# Patient Record
Sex: FEMALE | Race: BLACK OR AFRICAN AMERICAN | Employment: UNEMPLOYED | URBAN - METROPOLITAN AREA
[De-identification: names, ages, dates, MRNs, and addresses within clinical notes are randomized per-mention and may not be internally consistent; named-entity substitution may affect disease eponyms.]

---

## 2017-03-05 ENCOUNTER — HOSPITAL ENCOUNTER (OUTPATIENT)
Dept: CT IMAGING | Age: 57
Discharge: HOME | End: 2017-03-05
Attending: ORTHOPAEDIC SURGERY
Payer: COMMERCIAL

## 2017-03-05 DIAGNOSIS — M54.16 LUMBAR RADICULOPATHY: ICD-10-CM

## 2017-03-05 PROCEDURE — 72131 CT LUMBAR SPINE W/O DYE: CPT

## 2017-09-20 ENCOUNTER — HOSPITAL ENCOUNTER (OUTPATIENT)
Dept: LAB | Age: 57
Discharge: HOME | End: 2017-09-20

## 2017-09-20 ENCOUNTER — HOSPITAL ENCOUNTER (OUTPATIENT)
Dept: GENERAL RADIOLOGY | Age: 57
Discharge: HOME | End: 2017-09-20
Payer: COMMERCIAL

## 2017-09-20 DIAGNOSIS — M12.279: ICD-10-CM

## 2017-09-20 DIAGNOSIS — M25.579 PAIN IN JOINT, ANKLE AND FOOT: ICD-10-CM

## 2017-09-20 PROCEDURE — 73610 X-RAY EXAM OF ANKLE: CPT

## 2017-09-20 PROCEDURE — 99001 SPECIMEN HANDLING PT-LAB: CPT | Performed by: SPECIALIST

## 2017-09-20 PROCEDURE — 73630 X-RAY EXAM OF FOOT: CPT

## 2018-02-20 ENCOUNTER — HOSPITAL ENCOUNTER (OUTPATIENT)
Dept: LAB | Age: 58
Discharge: HOME | End: 2018-02-20

## 2018-02-20 PROCEDURE — 99001 SPECIMEN HANDLING PT-LAB: CPT | Performed by: SPECIALIST

## 2018-04-17 ENCOUNTER — HOSPITAL ENCOUNTER (OUTPATIENT)
Dept: LAB | Age: 58
Discharge: HOME | End: 2018-04-17

## 2018-04-17 PROCEDURE — 99001 SPECIMEN HANDLING PT-LAB: CPT | Performed by: SPECIALIST

## 2018-07-23 ENCOUNTER — HOSPITAL ENCOUNTER (OUTPATIENT)
Dept: LAB | Age: 58
Discharge: HOME | End: 2018-07-23

## 2018-07-23 PROCEDURE — 99001 SPECIMEN HANDLING PT-LAB: CPT | Performed by: SPECIALIST

## 2018-12-01 ENCOUNTER — HOSPITAL ENCOUNTER (OUTPATIENT)
Dept: GENERAL RADIOLOGY | Age: 58
Discharge: HOME | End: 2018-12-01
Payer: COMMERCIAL

## 2018-12-01 PROCEDURE — 73630 X-RAY EXAM OF FOOT: CPT

## 2018-12-01 PROCEDURE — 73610 X-RAY EXAM OF ANKLE: CPT

## 2019-05-21 ENCOUNTER — OFFICE VISIT (OUTPATIENT)
Dept: ORTHOPEDIC SURGERY | Age: 59
End: 2019-05-21

## 2019-05-21 VITALS
TEMPERATURE: 97.7 F | HEART RATE: 70 BPM | RESPIRATION RATE: 16 BRPM | DIASTOLIC BLOOD PRESSURE: 86 MMHG | WEIGHT: 219 LBS | HEIGHT: 65 IN | SYSTOLIC BLOOD PRESSURE: 130 MMHG | OXYGEN SATURATION: 98 % | BODY MASS INDEX: 36.49 KG/M2

## 2019-05-21 DIAGNOSIS — E66.01 SEVERE OBESITY (HCC): ICD-10-CM

## 2019-05-21 DIAGNOSIS — M76.62 ACHILLES TENDINITIS OF BOTH LOWER EXTREMITIES: ICD-10-CM

## 2019-05-21 DIAGNOSIS — R20.8 DECREASED SENSATION OF LOWER EXTREMITY: ICD-10-CM

## 2019-05-21 DIAGNOSIS — M76.61 ACHILLES TENDINITIS OF BOTH LOWER EXTREMITIES: ICD-10-CM

## 2019-05-21 DIAGNOSIS — M72.2 PLANTAR FASCIITIS, BILATERAL: Primary | ICD-10-CM

## 2019-05-21 RX ORDER — OMEPRAZOLE 20 MG/1
20 CAPSULE, DELAYED RELEASE ORAL DAILY
COMMUNITY

## 2019-05-21 RX ORDER — RANITIDINE 150 MG/1
150 CAPSULE ORAL 2 TIMES DAILY
COMMUNITY

## 2019-05-21 RX ORDER — GABAPENTIN 300 MG/1
300 CAPSULE ORAL 3 TIMES DAILY
COMMUNITY

## 2019-05-21 RX ORDER — LEVOTHYROXINE SODIUM 50 UG/1
TABLET ORAL
COMMUNITY

## 2019-05-21 RX ORDER — TRAMADOL HYDROCHLORIDE 50 MG/1
50 TABLET ORAL
COMMUNITY

## 2019-05-21 RX ORDER — HYDROXYCHLOROQUINE SULFATE 200 MG/1
200 TABLET, FILM COATED ORAL DAILY
COMMUNITY

## 2019-05-21 RX ORDER — AMLODIPINE BESYLATE 5 MG/1
5 TABLET ORAL DAILY
COMMUNITY

## 2019-05-21 RX ORDER — ZOLPIDEM TARTRATE 10 MG/1
TABLET ORAL
COMMUNITY

## 2019-05-21 RX ORDER — BISMUTH SUBSALICYLATE 262 MG
1 TABLET,CHEWABLE ORAL DAILY
COMMUNITY

## 2019-05-21 RX ORDER — HYDROCHLOROTHIAZIDE 25 MG/1
25 TABLET ORAL DAILY
COMMUNITY

## 2019-05-21 NOTE — PROGRESS NOTES
1. Have you been to the ER, urgent care clinic since your last visit? Hospitalized since your last visit? No    2. Have you seen or consulted any other health care providers outside of the [address] [place] [address] since your last visit? Include any pap smears or colon screening.   NO

## 2019-05-21 NOTE — PATIENT INSTRUCTIONS
You have been provided with an order for durable medical equipment that you may  at an outside facility as our office does not carry the equipment you need. You may pick it up at any medical supply company you like. Listed below are a few different locations for your convenience:    [address]  Phone: [phone]  [place]:   [address]  [place], [address] [place]:  [address] Faith Sarabia  [facility], [facility] [address]  [place]:  [facility]ly [facility],[address]. [place], [address]    Plantar Fasciitis: Exercises  Your Care Instructions  Here are some examples of typical rehabilitation exercises for your condition. Start each exercise slowly. Ease off the exercise if you start to have pain. Your doctor or physical therapist will tell you when you can start these exercises and which ones will work best for you. How to do the exercises  Towel stretch    1. Sit with your legs extended and knees straight. 2. Place a towel around your foot just under the toes. 3. Hold each end of the towel in each hand, with your hands above your knees. 4. Pull back with the towel so that your foot stretches toward you. 5. Hold the position for at least 15 to 30 seconds. 6. Repeat 2 to 4 times a session, up to 5 sessions a day. Calf stretch    1. Stand facing a wall with your hands on the wall at about eye level. Put the leg you want to stretch about a step behind your other leg. 2. Keeping your back heel on the floor, bend your front knee until you feel a stretch in the back leg. 3. Hold the stretch for 15 to 30 seconds. Repeat 2 to 4 times. Plantar fascia and calf stretch    1. Stand on a step as shown above. Be sure to hold on to the banister. 2. Slowly let your heels down over the edge of the step as you relax your calf muscles. You should feel a gentle stretch across the bottom of your foot and up the back of your leg to your knee.   3. Hold the stretch about 15 to 30 seconds, and then tighten your calf muscle a little to bring your heel back up to the level of the step. Repeat 2 to 4 times. Towel curls    1. While sitting, place your foot on a towel on the floor and scrunch the towel toward you with your toes. 2. Then, also using your toes, push the towel away from you. [place] pickups    1. Put marbles on the floor next to a cup.  2. Using your toes, try to lift the marbles up from the floor and put them in the cup. Follow-up care is a key part of your treatment and safety. Be sure to make and go to all appointments, and call your doctor if you are having problems. It's also a good idea to know your test results and keep a list of the medicines you take. Where can you learn more? Go to http://dwayne-kathleen.info/. Michi Rodriguez in the search box to learn more about \"Plantar Fasciitis: Exercises. \"  Current as of: September 20, 2018  Content Version: 11.9  © 8256-5778 [employer]. Care instructions adapted under license by [employer] (which disclaims liability or warranty for this information). If you have questions about a medical condition or this instruction, always ask your healthcare professional. [address] any warranty or liability for your use of this information.

## 2019-05-21 NOTE — LETTER
NOTIFICATION RETURN TO WORK / SCHOOL 
 
5/21/2019 4:01 PM 
 
Ms. Betty Merlos [facility] [address] [address] To Whom It May Concern: 
 
Betty Merlos is currently under the care of [address] [place] [address] [place]. Please allow Ms. Brianne Coello to remain out of work for the next 3 months. She is anticipated to return to work on September 1st, 2019. She is under our care for severe symptomatic Achilles tendinitis and plantar fasciitis. She is being referral for a neurologic assessment as part as her treatment plan. She is encouraged to continue her PCP's recommendation of Physical Therapy and custom orthotics. If there are questions or concerns please have the patient contact our office.  
 
 
 
Sincerely, 
 
 
Bhavna Abraham MD

## 2019-05-21 NOTE — PROGRESS NOTES
AMBULATORY PROGRESS NOTE      Patient: Gianna Jenkins             MRN: [id number]     SSN: [id number] Body mass index is 36.44 kg/m². YOB: 1960     AGE: 63 y.o. EX: female    PCP: Anna Laboy MD     IMPRESSION/DIAGNOSIS AND TREATMENT PLAN     DIAGNOSES  1. Plantar fasciitis, bilateral    2. Achilles tendinitis of both lower extremities    3. Severe obesity ([address]    4. Decreased sensation of lower extremity        Orders Placed This Encounter    AMB SUPPLY ORDER    REFERRAL TO NEUROLOGY    EMG ONE EXTREMITY LOWER LT    EMG ONE EXTREMITY LOWER RT    NCV/LAT MOTOR PER NERVE LOW/LT    NCV/LAT MOTOR PER NERVE LOW/RT      Gianna Jenkins understands her diagnoses and the proposed plan. Gianna Jenkins was mixed CT disorder, RA, achilles tendonitis, plantar fasciitis. Plan:    1) DME Order: Custom bilateral trilaminar orthotic insert with metatarsal pad and medial posting (TPC). 2) Referral to Neurology. 3) EMG and NCS of the BLE. 4) Work Note: Please allow Ms. Sunil Das to remain out of work for the next 3 months. She is anticipated to return to work on September 1st, 2019. She is under our care for severe symptomatic Achilles tendinitis and plantar fasciitis. She is being referral for a neurologic assessment as part as her treatment plan. She is encouraged to continue her PCP's recommendation of Physical Therapy and custom orthotics. 5) Continue PT as ordered by PCP. RTO - after EMG/NCS     HPI AND EXAMINATION     Charles Hardy IS A 63 y.o. female who presents to my outpatient office complaining of bilateral foot pain. Ms. Sunil Das states that since December 2018, she has been experiencing burning pain in her bilateral feet. When she first gets out of bed in the morning, she finds that she has to wait a few minutes before walking due to pain. She notes that the pain is located in her bilaeral plantar heels.  She finds that elevating her feet seems to help with her pain. She has been seen by her PCP for this issue, and her PCP prescribed her medication, provided a referral to PT, and gave her a work note to keep her out of work. She reports that she has a h/o RA, but that these symptoms are new. She reports that she has h/o connective tissue disorder in her back. She notes that she has had to decrease the amount of hours she works due to the pain she has been experiencing. The patient has h/o RA, UC, and connective tissue disorder in her back. Visit Vitals  /86 (BP 1 Location: Left arm, BP Patient Position: Sitting)   Pulse 70   Temp 97.7 °F (36.5 °C) (Oral)   Resp 16   Ht 5' 5\" (1.651 m)   Wt 219 lb (99.3 kg)   SpO2 98%   BMI 36.44 kg/m²     Appearance: Alert, well appearing and pleasant patient who is in no distress, oriented to person, place/time, and who follows commands. This patient is accompanied in the examination room by her self. Dementia: no dementia  Psychiatric: Affect and mood are appropriate. Patient arrives to office via: without assistive device  HEENT: Head normocephalic & atraumatic. Both pupils are round, non icteric sclera   Eye: EOM are intact and sclera are clear    Neck: ROM WNL and JVD neck is not present     Hearings Intact, does not require hearing aid device  Respiratory: Breathing is unlabored without accessory chest muscle use  Cardiovascular/Peripheral Vascular: Normal Pulses to each foot    ANKLE/FOOT left    Gait: Normal  Tenderness: mild tenderness to the distal Achilles tendon. Mild tenderness to the sinus tarsi. Mild tenderness to the plantar fascia. Cutaneous: Fungal infection to the lateral half of the great toe nail plate. Joint Motion: WNL. Joint / Tendon Stability: No Ankle or Subtalar instability or joint laxity. No peroneal sublux ability or dislocation  Alignment: Forefoot, Midfoot, Hindfoot WNL.    Neuro Motor: NL.    Decreased monofilament testing   Vascular: NL foot/ankle pulses. Lymphatics: No extremity lymphedema, No calf swelling, no tenderness to calf muscles. ANKLE/FOOT right    Tenderness: Mild tenderness to the distal Achilles tendon. Mild tenderness to the sinus tarsi. Mild tenderness to the plantar fascia. Cutaneous: Fungal infection to the great toe nail plate. Joint Motion: WNL  Joint / Tendon Stability: No Ankle or Subtalar instability or joint laxity. No peroneal sublux ability or dislocation  Alignment: Forefoot, Midfoot, Hindfoot WNL. Neuro Motor: NL.    Decreased monofilament testing   Vascular: NL foot/ankle pulses. Lymphatics: No extremity lymphedema, No calf swelling, no tenderness to calf muscles. CHART REVIEW     Past Medical History:   Diagnosis Date    Anemia NEC     [facility]m [facility]     Cancer ([facility]     colon     History of colonic polyps     Hypertension     Seizures (HCC)     Tendency toward bleeding easily (HCC)      Current Outpatient Medications   Medication Sig    zolpidem (AMBIEN) 10 mg tablet Take  by mouth nightly as needed for Sleep.  omeprazole (PRILOSEC) 20 mg capsule Take 20 mg by mouth daily.  gabapentin (NEURONTIN) 300 mg capsule Take 300 mg by mouth three (3) times daily.  levothyroxine (SYNTHROID) 50 mcg tablet Take  by mouth Daily (before breakfast).  hydroxychloroquine (PLAQUENIL) 200 mg tablet Take 200 mg by mouth daily.  hydroCHLOROthiazide (HYDRODIURIL) 25 mg tablet Take 25 mg by mouth daily.  amLODIPine (NORVASC) 5 mg tablet Take 5 mg by mouth daily.  raNITIdine hcl 150 mg capsule Take 150 mg by mouth two (2) times a day.  traMADol (ULTRAM) 50 mg tablet Take 50 mg by mouth every six (6) hours as needed for Pain.  multivitamin (ONE A DAY) tablet Take 1 Tab by mouth daily.  psyllium (METAMUCIL) 0.52 gram capsule Take 1 Cap by mouth daily.  PROPRANOLOL HCL (PROPRANOLOL PO) Take  by mouth.  METRONIDAZOLE PO Take  by mouth.       DIPHENOXYLATE HCL/ATROPINE (DIPHENOXYLATE-ATROPINE PO) Take  by mouth.  FOLIC ACID PO Take 1 mg by mouth.  CITALOPRAM HYDROBROMIDE (CITALOPRAM PO) Take 20 mg by mouth.  DICLOFENAC POTASSIUM PO Take 75 mg by mouth.  ASCORBATE CALCIUM (VITAMIN C PO) Take  by mouth.  MULTIVITS,CA,MINERALS/IRON/FA (ONE-A-DAY WOMENS FORMULA PO) Take  by mouth.  NAPROXEN SODIUM (ALEVE PO) Take  by mouth. No current facility-administered medications for this visit. Allergies   Allergen Reactions    Aspirin (Bulk) Not Reported This Time    Contrast Agent [Iodine] Not Reported This Time     \"x ray dye\"    Dilantin [Phenytoin Sodium Extended] Not Reported This Time     Past Surgical History:   Procedure Laterality Date    HX BREAST REDUCTION      Oct 2015    HX GI      HX OTHER SURGICAL      \"colon surgery\"    HX OTHER SURGICAL      \"right and left arm pit\"    HX TUBAL LIGATION       Social History     Occupational History    Not on file   Tobacco Use    Smoking status: Former Smoker     Last attempt to quit: 1993     Years since quittin.0    Smokeless tobacco: Never Used   Substance and Sexual Activity    Alcohol use: Not on file    Drug use: Not on file    Sexual activity: Not on file     Family History   Problem Relation Age of Onset    Breast Cancer Mother         REVIEW OF SYSTEMS : 2019  ALL BELOW ARE Negative except : SEE HPI     Constitutional: Negative for fever, chills and weight loss. Neg Weight Loss  Cardiovascular: Negative for chest pain, claudication and leg swelling. SOB, BULLOCK   Gastrointestinal/Urological: Negative for  pain, N/V/D/C, Blood in stool or urine,dysuria                         Hematuria, Incontinence, pelvic pain  Musculoskeletal: see HPI. Neurological: Negative for dizziness and weakness, headaches,Visual Changes             Confusion,  Or Seizures,   Psychiatric/Behavioral: Negative for depression, memory loss and substance abuse.    Extremities:  Negative for hair changes, rash or skin lesion changes. Hematologic: Negative for Bleeding problems, bruising, pallor or swollen lymph nodes. Peripheral Vascular: No calf pain, vascular vein tenderness to calf pain              No calf throbbing, posterior knee throbbing pain     DIAGNOSTIC IMAGING     No notes on file       [facility]: INTERPRETATION BY RADIOLOGIST     XR Results (maximum last 3): Results from [place] encounter on 12/01/18   XR ANKLE LT MIN 3 V    Narrative Left ankle, 3 views    HISTORY: Pain. No soft tissue swelling. No acute fracture or dislocation. Small plantar  calcaneal spur with no erosion. Achilles enthesopathy, mild. Impression IMPRESSION: No acute findings   XR ANKLE RT MIN 3 V    Narrative Right ankle, 3 views    HISTORY: Pain    No fracture or dislocation. Small plantar calcaneal spur. No erosion. Mild  Achilles enthesopathy. Talar dome is intact. Impression IMPRESSION: No acute findings. XR FOOT LT MIN 3 V    Narrative Left foot, 3 views    HISTORY: Pain. Comparison September 20, 2017    No fracture or dislocation. No erosion. Minimal degenerative joint disease at  first MTP joint. Small plantar calcaneal spur with no erosion. Mild Achilles  enthesopathy. Hyperflexion at the metatarsophalangeal joints      Impression IMPRESSION: No acute findings. Stable since last study. Result Information     Status: Final result (Exam End: 12/1/2018 17:02) Provider Status: Open   Study Result     Right foot, 3 views     History: Pain     No fracture or dislocation. Small linear density medial to the first metatarsal  head, nonspecific. No bony erosion. Degenerative joint disease at first MTP,  mild. Mild Achilles enthesopathy. Small plantar calcaneal spur with no erosion.     IMPRESSION  IMPRESSION: Mild DJD at first MTP joint. Linear density at the medial MTP joint,  nonspecific:  Calcification, foreign body or small old avulsion fracture  fragment.      Result Information Status: Final result (Exam End: 12/1/2018 17:02) Provider Status: Open   Study Result     Right ankle, 3 views     HISTORY: Pain     No fracture or dislocation. Small plantar calcaneal spur. No erosion. Mild  Achilles enthesopathy. Talar dome is intact.     IMPRESSION  IMPRESSION: No acute findings         Result Information     Status: Final result (Exam End: 12/1/2018 17:02) Provider Status: Open   Study Result     Left foot, 3 views     HISTORY: Pain.     Comparison September 20, 2017     No fracture or dislocation. No erosion. Minimal degenerative joint disease at  first MTP joint. Small plantar calcaneal spur with no erosion. Mild Achilles  enthesopathy. Hyperflexion at the metatarsophalangeal joints     IMPRESSION  IMPRESSION: No acute findings. Stable since last study       Written by Addie Bajwa, as dictated by Dr. Anamika Swain. I, Dr. Anamika Swain, confirm that all documentation is accurate.

## 2019-05-22 ENCOUNTER — DOCUMENTATION ONLY (OUTPATIENT)
Dept: ORTHOPEDIC SURGERY | Age: 59
End: 2019-05-22

## 2019-05-22 NOTE — PROGRESS NOTES
Patient came to [facility] location and dropped off the following form for completion:    -ADA Reasonable Accommodation Request, Medical Certification of Physician    Please complete and contact patient when ready to be picked up at the [facility] office location. Patient can be reached at [phone]. Patient is aware of 7-10 business day form completion time.

## 2019-06-04 ENCOUNTER — DOCUMENTATION ONLY (OUTPATIENT)
Dept: ORTHOPEDIC SURGERY | Age: 59
End: 2019-06-04

## 2019-06-04 NOTE — PROGRESS NOTES
Patient dropped off [employer] disability form to be completed and faxed. Patient will  original from [facility] office, please advise once completed at [phone].

## 2019-06-07 NOTE — PROGRESS NOTES
Forms completed and faxed, as requested by patient. Patient was informed of this and  that her copy of forms are ready to be picked up at the [facility] location.

## 2019-06-21 ENCOUNTER — DOCUMENTATION ONLY (OUTPATIENT)
Dept: ORTHOPEDIC SURGERY | Age: 59
End: 2019-06-21

## 2019-06-24 ENCOUNTER — TELEPHONE (OUTPATIENT)
Dept: ORTHOPEDIC SURGERY | Age: 59
End: 2019-06-24

## 2019-06-24 NOTE — TELEPHONE ENCOUNTER
Patient is requesting someone call her to assist with some additional questions regarding her disability. She says [employer] received our form, however, they have a few more questions That patient needs answered, please contact her at [phone].

## 2019-06-24 NOTE — TELEPHONE ENCOUNTER
Patient called again and is requesting if Sae Davenport could call [employer]. They have additional questions that need to be answered. Merlinda Bevels life customer service tel # [phone]. Claim # [id number]. Patient states it is important that this be done as soon as possible. Patient is requesting a call back at tel. [phone].

## 2019-06-26 NOTE — TELEPHONE ENCOUNTER
Patient called in requesting to see If this can be done by the end of this week.  Patient left a fax # [phone] and she left claim # [id number]  Patient can be reached at [phone]

## 2019-06-26 NOTE — TELEPHONE ENCOUNTER
Called number listed for peer to peer. Voice prompts requesting patient SSN. Please provide required information for completion of Peer to peer.      Mauricio Mckeon PA-C  6/26/2019   4:20 PM

## 2019-07-03 NOTE — TELEPHONE ENCOUNTER
Patient called in states that she needs Dr. Ry Roberts to have these dates down as the insurance company is going to want them. She states that she sees Neurology on 07/15/19, she has her insert fitting 07/16/19 and nerve study test 07/19/19 all ordered by Dr. Ry Roberts. Patient states that the insurance company needs it all completed by 07/13/19. Patient left fax # of [phone] claim# [id number].

## 2019-07-05 NOTE — TELEPHONE ENCOUNTER
Spoke with patient, she states that a form was supposed to be sent over. Explained to patient that her form is not in the forms bin. Patient will have form re-faxed. Patient aware that it takes 7-10 business days.

## 2019-07-09 NOTE — TELEPHONE ENCOUNTER
Patient called again and said that the form that was sent to the [facility] location  for [employer] on 07/05/19 , needs to be completed before 07/13/2019. Patient aware all forms take 7-10 business days , but she said they are requesting it be completed before 7/13/19. Patient tel. [phone].

## 2019-07-10 NOTE — TELEPHONE ENCOUNTER
Patient called back wanting to speak to Sarah Levy about her forms today. Please advise patient at [phone].

## 2019-07-10 NOTE — TELEPHONE ENCOUNTER
Form had been completed and faxed. Original placed in scanning bin and copy placed up front for patient to .  Patient made aware

## 2019-07-19 ENCOUNTER — OFFICE VISIT (OUTPATIENT)
Dept: ORTHOPEDIC SURGERY | Age: 59
End: 2019-07-19

## 2019-07-19 VITALS
DIASTOLIC BLOOD PRESSURE: 92 MMHG | BODY MASS INDEX: 37.49 KG/M2 | TEMPERATURE: 97.6 F | RESPIRATION RATE: 14 BRPM | OXYGEN SATURATION: 100 % | HEIGHT: 65 IN | HEART RATE: 80 BPM | WEIGHT: 225 LBS | SYSTOLIC BLOOD PRESSURE: 143 MMHG

## 2019-07-19 DIAGNOSIS — M79.662 PAIN IN BOTH LOWER LEGS: Primary | ICD-10-CM

## 2019-07-19 DIAGNOSIS — M79.661 PAIN IN BOTH LOWER LEGS: Primary | ICD-10-CM

## 2019-07-19 DIAGNOSIS — G62.9 NEUROPATHY: Primary | ICD-10-CM

## 2019-07-19 NOTE — PROGRESS NOTES
[address]  [address], [address]  [place], [address] [place] [address] Street  Phone: [phone]  Fax: [phone]        Felix Mckinley  : 1960  PCP: Garrett Isaac MD  2019    ELECTROMYOGRAPHY AND NERVE CONDUCTION STUDIES    Arelis Cardenas was referred by Dr. Krystal Bhatt for electrodiagnostic evaluation of BLE numbness and tingling. NCV & EMG Findings:  All nerve conduction studies (as indicated in the following tables) were within normal limits. Left vs. Right side comparison data for the tibial motor nerve indicates abnormal L-R amplitude difference (51.6 %). All remaining left vs. right side differences were within normal limits. All examined muscles (as indicated in the following table) showed no evidence of electrical instability. INTERPRETATION  This was a normal nerve conduction and EMG study showing there to be no signs of neuropathy, myopathy, or radiculopathy in the nerves and muscles tested. CLINICAL INTERPRETATION  Her electrodiagnostic results do not appear to explain her symptoms. On physical examination, she appears to have trigger points in the lumbar paraspinal muscles which reproduce some of her radicular pain. HISTORY OF PRESENT ILLNESS  Arelis Cardenas is a 63 y.o. female with history of RA. Pt presents today for BLE EMG for evaluation of bilateral foot pain, numbness and tingling x 8 months. Pt notes that her pain is burning in nature and and is located to her bilateral heels. She describes her symptoms in an L5-S1 distribution. She rates her pain as 7/10 today. She states that when she wakes up in the morning, it takes her a number of minutes before she can walk. She notes improvement with elevation. She states that she intends to begin water aerobics. Pt endorses numbness radiating from feet to hip. She denies trauma or injury to the area.  Per Dr. Partida Los note, there appears to be signs of bilateral plantar fasciitis, achilles tendinitis, and diminished sensation of both LE. Patient works at an assisted living facility. PAST MEDICAL HISTORY   Past Medical History:   Diagnosis Date    Anemia NEC     [facility]neur[facility] ([facility] [address] [facility]     Cancer ([facility] [address] [facility]     colon     History of colonic polyps     Hypertension     Seizures ([facility] [address] [facility]     Tendency toward bleeding easily [facility])        Past Surgical History:   Procedure Laterality Date    HX BREAST REDUCTION      Oct 2015    HX GI      HX OTHER SURGICAL      \"colon surgery\"    HX OTHER SURGICAL      \"right and left arm pit\"    HX TUBAL LIGATION     . MEDICATIONS    Current Outpatient Medications   Medication Sig Dispense Refill    zolpidem (AMBIEN) 10 mg tablet Take  by mouth nightly as needed for Sleep.  omeprazole (PRILOSEC) 20 mg capsule Take 20 mg by mouth daily.  gabapentin (NEURONTIN) 300 mg capsule Take 300 mg by mouth three (3) times daily.  levothyroxine (SYNTHROID) 50 mcg tablet Take  by mouth Daily (before breakfast).  hydroxychloroquine (PLAQUENIL) 200 mg tablet Take 200 mg by mouth daily.  hydroCHLOROthiazide (HYDRODIURIL) 25 mg tablet Take 25 mg by mouth daily.  amLODIPine (NORVASC) 5 mg tablet Take 5 mg by mouth daily.  raNITIdine hcl 150 mg capsule Take 150 mg by mouth two (2) times a day.  traMADol (ULTRAM) 50 mg tablet Take 50 mg by mouth every six (6) hours as needed for Pain.  multivitamin (ONE A DAY) tablet Take 1 Tab by mouth daily.  psyllium (METAMUCIL) 0.52 gram capsule Take 1 Cap by mouth daily.  PROPRANOLOL HCL (PROPRANOLOL PO) Take  by mouth.  METRONIDAZOLE PO Take  by mouth.  DIPHENOXYLATE HCL/ATROPINE (DIPHENOXYLATE-ATROPINE PO) Take  by mouth.  CITALOPRAM HYDROBROMIDE (CITALOPRAM PO) Take 20 mg by mouth.  DICLOFENAC POTASSIUM PO Take 75 mg by mouth.  FOLIC ACID PO Take 1 mg by mouth.  ASCORBATE CALCIUM (VITAMIN C PO) Take  by mouth.       MULTIVITS,CA,MINERALS/IRON/FA (ONE-A-DAY WOMENS FORMULA PO) Take  by mouth.  NAPROXEN SODIUM (ALEVE PO) Take  by mouth. ALLERGIES  Allergies   Allergen Reactions    Aspirin (Bulk) Not Reported This Time    Contrast Agent [Iodine] Not Reported This Time     \"x ray dye\"    Dilantin [Phenytoin Sodium Extended] Not Reported This Time          SOCIAL HISTORY    Social History     Socioeconomic History    Marital status:      Spouse name: Not on file    Number of children: Not on file    Years of education: Not on file    Highest education level: Not on file   Tobacco Use    Smoking status: Former Smoker     Last attempt to quit: 1993     Years since quittin.1    Smokeless tobacco: Never Used       FAMILY HISTORY  Family History   Problem Relation Age of Onset    Breast Cancer Mother          PHYSICAL EXAMINATION  Visit Vitals  BP (!) 143/92   Pulse 80   Temp 97.6 °F (36.4 °C) (Oral)   Resp 14   Ht 5' 5\" (1.651 m)   Wt 225 lb (102.1 kg)   SpO2 100%   BMI 37.44 kg/m²       Pain Assessment  2019   Location of Pain Foot   Location Modifiers Right;Left   Severity of Pain 10   Quality of Pain Burning; Sharp   Duration of Pain Persistent   Frequency of Pain Constant   Aggravating Factors Standing;Walking   Limiting Behavior Yes   Relieving Factors Elevation; Rest   Relieving Factors Comment pillows   Result of Injury No         Constitutional:  Well developed, well nourished, in no acute distress. Psychiatric: Affect and mood are appropriate. Integumentary: No rashes or abrasions noted on exposed areas. SPINE/MUSCULOSKELETAL EXAM    On brief examination: Decreased monofilament testing per Dr. Yamilka Marie. Tenderness to palpation of lumbar paraspinals with trigger points.      MOTOR:      Biceps  Triceps Deltoids Wrist Ext Wrist Flex Hand Intrin   Right 5/5 5/5 5/5 5/5 5/5 5/5   Left 5/5 5/5 5/5 5/5 5/5 5/5             Hip Flex  Quads Hamstrings Ankle DF EHL Ankle PF   Right 5/5 5/5 5/5 5/5 5/5 5/5   Left 5/5 5/5 5/5 5/5 5/5 5/5     NCV & EMG Findings:  Nerve Conduction Studies  Anti Sensory Summary Table     Stim Site NR Peak (ms) Norm Peak (ms) O-P Amp (µV) Norm O-P Amp Site1 Site2 Delta-P (ms) Dist (cm) Moshe (m/s) Norm Moshe (m/s)   Left Sup Fibular Anti Sensory (Ant Lat Mall)   14 cm    3.8 <4.4 8.2 >5.0 14 cm Ant Lat Mall 3.8 14.0 37 >32   Right Sup Fibular Anti Sensory (Ant Lat Mall)   14 cm    3.9 <4.4 6.4 >5.0 14 cm Ant Lat Mall 3.9 14.0 36 >32   Left Sural Anti Sensory (Lat Mall)   Calf    3.8 <4.0 10.8 >5.0 Calf Lat Mall 3.8 14.0 37 >35   Right Sural Anti Sensory (Lat Mall)   Calf    4.0 <4.0 7.4 >5.0 Calf Lat Mall 4.0 14.0 35 >35     Motor Summary Table     Stim Site NR Onset (ms) Norm Onset (ms) O-P Amp (mV) Norm O-P Amp Site1 Site2 Delta-0 (ms) Dist (cm) Moshe (m/s) Norm Moshe (m/s)   Left Fibular Motor (Ext Dig Brev)   Ankle    3.7 <6.1 3.6 >2.5 B Fib Ankle 6.2 31.0 50 >38   B Fib    9.9  3.0  Poplt B Fib 1.4 7.0 50 >40   Poplt    11.3  2.8          Right Fibular Motor (Ext Dig Brev)   Ankle    3.8 <6.1 6.7 >2.5 B Fib Ankle 6.1 29.0 48 >38   B Fib    9.9  6.4  Poplt B Fib 1.3 7.0 54 >40   Poplt    11.2  6.3          Left Tibial Motor (Abd Bajwa Brev)   Ankle    3.9 <6.1 6.2 >3.0 Knee Ankle 7.5 36.0 48 >35   Knee    11.4  3.2          Right Tibial Motor (Abd Bajwa Brev)   Ankle    3.6 <6.1 3.0 >3.0 Knee Ankle 8.3 36.0 43 >35   Knee    11.9  2.4            EMG     Side Muscle Nerve Root Ins Act Fibs Psw Amp Dur Poly Recrt Int Angela Pinta Comment   Left VastusMed Femoral L2-4 Nml Nml Nml Nml Nml 0 Nml Nml    Left AntTibialis Dp Br Fibular L4-5 Nml Nml Nml Nml Nml 0 Nml Nml    Left Gastroc Tibial S1-2 Nml Nml Nml Nml Nml 0 Nml Nml    Right VastusMed Femoral L2-4 Nml Nml Nml Nml Nml 0 Nml Nml    Right AntTibialis Dp Br Fibular L4-5 Nml Nml Nml Nml Nml 0 Nml Nml    Right Gastroc Tibial S1-2 Nml Nml Nml Nml Nml 0 Nml Nml    Left ExtHallLong Dp Br Fibular L5, S1 Nml Nml Nml Nml Nml 0 Nml Nml Left PostTibialis Tibial L5, S1 Nml Nml Nml Nml Nml 0 Nml Nml    Right ExtHallLong Dp Br Fibular L5, S1 Nml Nml Nml Nml Nml 0 Nml Nml    Right PostTibialis Tibial L5, S1 Nml Nml Nml Nml Nml 0 Nml Nml    Left Lumbo Parasp Up Rami L1-2 Nml Nml Nml         Left Lumbo Parasp Mid Rami L3-4 Nml Nml Nml         Left Lumbo Parasp Low Rami L5-S1 Nml Nml Nml         Right Lumbo Parasp Up Rami L1-2 Nml Nml Nml         Right Lumbo Parasp Mid Rami L3-4 Nml Nml Nml         Right Lumbo Parasp Low Rami L5-S1 Nml Nml Nml             Nerve Conduction Studies  Anti Sensory Left/Right Comparison     Stim Site L Lat (ms) R Lat (ms) L-R Lat (ms) L Amp (µV) R Amp (µV) L-R Amp (%) Site1 Site2 L Moshe (m/s) R Moshe (m/s) L-R Moshe (m/s)   Sup Fibular Anti Sensory (Ant Lat Mall)   14 cm 3.8 3.9 0.1 8.2 6.4 22.0 14 cm Ant Lat Mall 37 36 1   Sural Anti Sensory (Lat Mall)   Calf 3.8 4.0 0.2 10.8 7.4 31.5 Calf Lat Mall 37 35 2     Motor Left/Right Comparison     Stim Site L Lat (ms) R Lat (ms) L-R Lat (ms) L Amp (mV) R Amp (mV) L-R Amp (%) Site1 Site2 L Moshe (m/s) R Moshe (m/s) L-R Moshe (m/s)   Fibular Motor (Ext Dig Brev)   Ankle 3.7 3.8 0.1 3.6 6.7 46.3 B Fib Ankle 50 48 2   B Fib 9.9 9.9 0.0 3.0 6.4 53.1 Poplt B Fib 50 54 4   Poplt 11.3 11.2 0.1 2.8 6.3 55.6        Tibial Motor (Abd Bajwa Brev)   Ankle 3.9 3.6 0.3 6.2 3.0 51.6 Knee Ankle 48 43 5   Knee 11.4 11.9 0.5 3.2 2.4 25.0              Waveforms:                            [facility]  OFFICE PROCEDURE PROGRESS NOTE        Chart reviewed for the following:   Edward MILLER, have reviewed the History, Physical and updated the Allergic reactions for [address] [facility] [address] performed immediately prior to start of procedure:   Edward MILLER, have performed the following reviews on [employer] prior to the start of the procedure:            * Patient was identified by name and date of birth   * Agreement on procedure being performed was verified  * Risks and Benefits explained to the patient  * Procedure site verified and marked as necessary  * Patient was positioned for comfort  * Consent was signed and verified     Time: 2:15 PM      Date of procedure: 7/19/2019    Procedure performed by:  Floyd Kumar MD    Provider accompanied by: Cary.     Patient accompanied by: Self    How tolerated by patient: tolerated the procedure well with no complications    Post Procedural Pain Scale: 0 - No Hurt    Comments: none    Written by Brodie Wright as dictated by Bertha Delatorre MD

## 2019-07-22 ENCOUNTER — OFFICE VISIT (OUTPATIENT)
Dept: ORTHOPEDIC SURGERY | Age: 59
End: 2019-07-22

## 2019-07-22 VITALS
WEIGHT: 225 LBS | SYSTOLIC BLOOD PRESSURE: 135 MMHG | DIASTOLIC BLOOD PRESSURE: 83 MMHG | RESPIRATION RATE: 14 BRPM | OXYGEN SATURATION: 99 % | HEART RATE: 74 BPM | TEMPERATURE: 96.8 F | HEIGHT: 65 IN | BODY MASS INDEX: 37.49 KG/M2

## 2019-07-22 DIAGNOSIS — M76.61 ACHILLES TENDINITIS OF BOTH LOWER EXTREMITIES: ICD-10-CM

## 2019-07-22 DIAGNOSIS — M76.821 POSTERIOR TIBIAL TENDINITIS OF RIGHT LOWER EXTREMITY: ICD-10-CM

## 2019-07-22 DIAGNOSIS — M72.2 PLANTAR FASCIITIS, BILATERAL: Primary | ICD-10-CM

## 2019-07-22 DIAGNOSIS — M76.62 ACHILLES TENDINITIS OF BOTH LOWER EXTREMITIES: ICD-10-CM

## 2019-07-22 RX ORDER — DULOXETIN HYDROCHLORIDE 30 MG/1
30 CAPSULE, DELAYED RELEASE ORAL DAILY
COMMUNITY

## 2019-07-22 NOTE — PROGRESS NOTES
AMBULATORY PROGRESS NOTE      Patient: Marni Gomez             MRN: [id number]     SSN: [id number] Body mass index is 37.44 kg/m². YOB: 1960     AGE: 63 y.o. EX: female    PCP: June Cox MD     IMPRESSION/DIAGNOSIS AND TREATMENT PLAN     DIAGNOSES  1. Plantar fasciitis, bilateral    2. Achilles tendinitis of both lower extremities    3. Posterior tibial tendinitis of right lower extremity        Orders Placed This Encounter    MRI ANKLE RT [address] understands her diagnoses and the proposed plan. Because she continues to have pain in her plantar fascial region and tarsal tunnel region, I am recommending an MRI of her ankle and hindfoot to assess the tib/post tendon and the tarsal tunnel. We will get things scheduled at her convenience. She had EMG nerve conduction studies by Dr. Julio Rivera on July 19, 2019. It showed normal nerve conduction studies and EMG studies were within normal limits. She is scheduled to see Dr. Aida Lal in the next few weeks. We will send this note to him. In any event, she is a 73-year-old female who is here for bilateral plantar fasciitis worse on the right side than on the left side with associated posterior tibial tendinitis. My orthopedic findings reveal tenderness to the posterior tibial tendon and tenderness to the tarsal tunnel region. My recommendation is for an MRI of her right ankle or hindfoot to assess the tarsal tunnel and assess her tib/post, as well as the plantar fascia. She does have a history of fibromyalgia. She has not been back to work in many months now. She is due to go back to work on September 1, 2019. We will make sure we send these notes with her. Plan:    1) MRI without IV Contrast of the right ankle; please assess posterior tibial tendon and tarsal tunnel. 2) Use the [employer] arch supports as planned. 3) Continue activity modification as directed.     4) Follow up with  Andres Remy on August 5th as planned. Dictate letter for him. RTO - after MRI     HPI AND EXAMINATION     Charles Singer IS A 63 y.o. female who presents to my outpatient office for follow up of bilateral plantar fasciitis and Achilles tendinitis. At the last visit, I provided an order for a custom orthotic with metatarsal pad and medial posting, provided a referral to Neurology, ordered EMG/NCS of the BLE, provided a work note, and instructed the patient to continue PT as ordered by PCP. Since we saw her last, Ms. Mary Singer states that she is still experiencing pain in her bilateral feet. EMG/NCS results have been reviewed with the patient. She notes that her pain is the worst when she is trying to sleep and when she is constantly moving, such as when she works, as she stands for 8 hours while at work. She notes that she is still experiencing burning pain with inversion in her medial ankle and in her plantar heels. She adds that she also experiences intermittent, sharp, burning pain in her lateral mid-leg areas. She notes that she can no longer wear heels and that she has to wear flat shoes while at [employer]. She states that her right foot is worse than her left. She notes that was unable to afford custom orthotics, so she has bought [employer] firm medial arch supports and New Balance shoes with memory soles. She has an appointment tomorrow for an evaluation for aquatherapy. This evaluation was ordered by her PCP. She has also been to PT for Graston technique and low frequency US. She is scheduled to return to work on September 1st. She ha an appointment with Dr. Andres Remy on August 5th. She notes that Dr. Yanci Resendiz has given her epidural injections to her back, as she was having back pain and leg pain. Dr. Dick Neville is her rheumatologist who follows her for fibromyalgia and mixed connective tissue disorder. The patient has h/o RA, UC, fibromyalgia, and connective tissue disorder in her back.      Visit Vitals  BP 135/83   Pulse 74   Temp 96.8 °F (36 °C) (Oral)   Resp 14   Ht 5' 5\" (1.651 m)   Wt 225 lb (102.1 kg)   SpO2 99%   BMI 37.44 kg/m²     Appearance: Alert, well appearing and pleasant patient who is in no distress, oriented to person, place/time, and who follows commands. This patient is accompanied in the examination room by her self. Dementia: no dementia  Psychiatric: Affect and mood are appropriate. Patient arrives to office via: without assistive device  HEENT: Head normocephalic & atraumatic. Both pupils are round, non icteric sclera   Eye: EOM are intact and sclera are clear    Neck: ROM WNL and JVD neck is not present     Hearings Intact, does not require hearing aid device  Respiratory: Breathing is unlabored without accessory chest muscle use  Cardiovascular/Peripheral Vascular: Normal Pulses to each foot     ANKLE/FOOT right    Gait: Normal  Tenderness: Mild tenderness to the distal Achilles tendon. Mild tenderness to the sinus tarsi. Mild tenderness to the plantar fascia. Mild tenderness to the tarsal navicular tuberosity. Mild tenderness to the posterior tibial tendon. Cutaneous: Fungal infection to the great toe nail plate. Joint Motion: WNL  Joint / Tendon Stability: No Ankle or Subtalar instability or joint laxity. No peroneal sublux ability or dislocation  Alignment: Forefoot, Midfoot, Hindfoot WNL. Neuro Motor: NL.    Decreased monofilament testing    Negative Tinel's. Vascular: NL foot/ankle pulses. Lymphatics: No extremity lymphedema, No calf swelling, no tenderness to calf muscles. ANKLE/FOOT left    Tenderness: mild tenderness to the distal Achilles tendon. Mild tenderness to the sinus tarsi. Mild tenderness to the plantar fascia. Cutaneous: Fungal infection to the lateral half of the great toe nail plate. Joint Motion: WNL. Joint / Tendon Stability: No Ankle or Subtalar instability or joint laxity.                        No peroneal sublux ability or dislocation  Alignment: Forefoot, Midfoot, Hindfoot WNL. Neuro Motor: NL.    Decreased monofilament testing    Negative Tinel's sign  Vascular: NL foot/ankle pulses. Lymphatics: No extremity lymphedema, No calf swelling, no tenderness to calf muscles. CHART REVIEW     Past Medical History:   Diagnosis Date    Anemia NEC     [facility]neur[facility]m ([facility] [address]     Cancer ([facility] [address]     colon     History of colonic polyps     Hypertension     Seizures (HCC)     Tendency toward bleeding easily (HCC)      Current Outpatient Medications   Medication Sig    DULoxetine (CYMBALTA) 30 mg capsule Take 30 mg by mouth daily.  zolpidem (AMBIEN) 10 mg tablet Take  by mouth nightly as needed for Sleep.  omeprazole (PRILOSEC) 20 mg capsule Take 20 mg by mouth daily.  gabapentin (NEURONTIN) 300 mg capsule Take 300 mg by mouth three (3) times daily.  levothyroxine (SYNTHROID) 50 mcg tablet Take  by mouth Daily (before breakfast).  hydroxychloroquine (PLAQUENIL) 200 mg tablet Take 200 mg by mouth daily.  hydroCHLOROthiazide (HYDRODIURIL) 25 mg tablet Take 25 mg by mouth daily.  amLODIPine (NORVASC) 5 mg tablet Take 5 mg by mouth daily.  raNITIdine hcl 150 mg capsule Take 150 mg by mouth two (2) times a day.  traMADol (ULTRAM) 50 mg tablet Take 50 mg by mouth every six (6) hours as needed for Pain.  multivitamin (ONE A DAY) tablet Take 1 Tab by mouth daily.  psyllium (METAMUCIL) 0.52 gram capsule Take 1 Cap by mouth daily.  PROPRANOLOL HCL (PROPRANOLOL PO) Take  by mouth.  METRONIDAZOLE PO Take  by mouth.  DIPHENOXYLATE HCL/ATROPINE (DIPHENOXYLATE-ATROPINE PO) Take  by mouth.  CITALOPRAM HYDROBROMIDE (CITALOPRAM PO) Take 20 mg by mouth.  DICLOFENAC POTASSIUM PO Take 75 mg by mouth.  FOLIC ACID PO Take 1 mg by mouth.  ASCORBATE CALCIUM (VITAMIN C PO) Take  by mouth.  MULTIVITS,CA,MINERALS/IRON/FA (ONE-A-DAY WOMENS FORMULA PO) Take  by mouth.       NAPROXEN SODIUM (ALEVE PO) Take  by mouth. No current facility-administered medications for this visit. Allergies   Allergen Reactions    Aspirin (Bulk) Not Reported This Time    Contrast Agent [Iodine] Not Reported This Time     \"x ray dye\"    Dilantin [Phenytoin Sodium Extended] Not Reported This Time     Past Surgical History:   Procedure Laterality Date    HX BREAST REDUCTION      Oct 2015    HX GI      HX OTHER SURGICAL      \"colon surgery\"    HX OTHER SURGICAL      \"right and left arm pit\"    HX TUBAL LIGATION       Social History     Occupational History    Not on file   Tobacco Use    Smoking status: Former Smoker     Last attempt to quit: 1993     Years since quittin.1    Smokeless tobacco: Never Used   Substance and Sexual Activity    Alcohol use: Not on file    Drug use: Not on file    Sexual activity: Not on file     Family History   Problem Relation Age of Onset    Breast Cancer Mother         REVIEW OF SYSTEMS : 2019  ALL BELOW ARE Negative except : SEE HPI     Constitutional: Negative for fever, chills and weight loss. Neg Weight Loss  Cardiovascular: Negative for chest pain, claudication and leg swelling. SOB, BULLOCK   Gastrointestinal/Urological: Negative for  pain, N/V/D/C, Blood in stool or urine,dysuria                         Hematuria, Incontinence, pelvic pain  Musculoskeletal: see HPI. Neurological: Negative for dizziness and weakness, headaches,Visual Changes             Confusion,  Or Seizures,   Psychiatric/Behavioral: Negative for depression, memory loss and substance abuse. Extremities:  Negative for hair changes, rash or skin lesion changes. Hematologic: Negative for Bleeding problems, bruising, pallor or swollen lymph nodes. Peripheral Vascular: No calf pain, vascular vein tenderness to calf pain              No calf throbbing, posterior knee throbbing pain     DIAGNOSTIC IMAGING     No notes on file    Please see above section of this report.     I have personally reviewed the results of the above study. The interpretation of this study is my professional opinion. Written by Magali Lowery, as dictated by Dr. Gloria Garcia. I, Dr. Gloria Garcia, confirm that all documentation is accurate.

## 2019-07-22 NOTE — PATIENT INSTRUCTIONS
An MRI or CT has been ordered for you. A [facility] will be contacting you to schedule the appointment as soon as it has been approved with your insurance company. Please schedule an appointment to follow up with the doctor or the physicians assistant after the MRI or CT has been conducted. Tendon Injury (Tendinopathy): Care Instructions  Your Care Instructions    Tendons are tough, flexible tissues that connect muscle to bone. A tendon can hurt or get torn from overuse or aging, especially tendons in the shoulder, elbow, wrist, hip, knee, or ankle. Tendon injuries may be called tendinopathy or tendinitis. Tendon injuries can occur from any motion you have to repeat in a job, sports, or daily activities. Tennis elbow is one common tendon injury. You can treat most tendon problems with over-the-counter pain medicine, rest, changes in your activities, and physical therapy. Follow-up care is a key part of your treatment and safety. Be sure to make and go to all appointments, and call your doctor if you are having problems. It's also a good idea to know your test results and keep a list of the medicines you take. How can you care for yourself at home? · Rest the sore area. You may have to stop doing the activity that caused the tendon pain for a while. · Take an over-the-counter pain medicine, such as acetaminophen (Tylenol), ibuprofen (Advil, Motrin), or naproxen (Aleve). Read and follow all instructions on the label. · Do not take two or more pain medicines at the same time unless the doctor told you to. Many pain medicines have acetaminophen, which is Tylenol. Too much acetaminophen (Tylenol) can be harmful. · Put ice or a cold pack on the sore area for 10 to 20 minutes at a time. Try to do this every 1 to 2 hours for the next 3 days (when you are awake) or until any swelling goes down. Put a thin cloth between the ice and your skin.   · Prop up the sore area on a pillow when you ice it or anytime you sit or lie down during the next 3 days. Try to keep it above the level of your heart. This will help reduce swelling. · Follow your doctor's advice for wearing and caring for a sling, splint, or cast. In some cases, you may wear one of these for a while to help your tendon heal.  · Follow your doctor's advice for stretching and physical therapy. Gently move your joint through its full range of motion. This will prevent stiffness in your joint. · Go back to your activity slowly. Warm up before and stretch after the activity. You also can try making some changes. For example, if a sport caused your tendon pain, alternate the sport with another activity. If using a tool causes pain, switch hands or change your . Stop the activity if it hurts. After the activity, apply ice to prevent pain and swelling. · Do not smoke. Smoking can slow healing. If you need help quitting, talk to your doctor about stop-smoking programs and medicines. These can increase your chances of quitting for good. When should you call for help? Watch closely for changes in your health, and be sure to contact your doctor if:    · Your pain gets worse.     · You do not get better as expected. Where can you learn more? Go to http://dwayne-kathleen.info/. Enter A157 in the search box to learn more about \"Tendon Injury (Tendinopathy): Care Instructions. \"  Current as of: September 20, 2018  Content Version: 12.1  © 8978-1633 [employer]. Care instructions adapted under license by [employer] (which disclaims liability or warranty for this information). If you have questions about a medical condition or this instruction, always ask your healthcare professional. [address] any warranty or liability for your use of this information.

## 2019-07-22 NOTE — PROGRESS NOTES
1. Have you been to the ER, urgent care clinic since your last visit? Hospitalized since your last visit? No    2. Have you seen or consulted any other health care providers outside of the [address] [place] [address] since your last visit? Include any pap smears or colon screening.  No

## 2019-07-26 ENCOUNTER — DOCUMENTATION ONLY (OUTPATIENT)
Dept: ORTHOPEDIC SURGERY | Age: 59
End: 2019-07-26

## 2019-07-26 NOTE — PROGRESS NOTES
I have attempted to contact this patient by phone RE: EMG that Dr. Jonnathan Olguin has ordered, NO ANSWER, phone just rings, and the # for work is not in service.

## 2019-10-01 ENCOUNTER — HOSPITAL ENCOUNTER (OUTPATIENT)
Dept: MAMMOGRAPHY | Age: 59
Discharge: HOME | End: 2019-10-01
Attending: FAMILY MEDICINE
Payer: COMMERCIAL

## 2019-10-01 DIAGNOSIS — Z12.31 VISIT FOR SCREENING MAMMOGRAM: ICD-10-CM

## 2019-10-01 PROCEDURE — 77067 SCR MAMMO BI INCL CAD: CPT

## 2019-10-18 ENCOUNTER — HOSPITAL ENCOUNTER (OUTPATIENT)
Dept: LAB | Age: 59
Discharge: HOME | End: 2019-10-18

## 2019-10-18 LAB — XX-LABCORP SPECIMEN COL,LCBCF: NORMAL

## 2019-10-18 PROCEDURE — 99001 SPECIMEN HANDLING PT-LAB: CPT

## 2020-02-18 ENCOUNTER — HOSPITAL ENCOUNTER (OUTPATIENT)
Dept: LAB | Age: 60
Discharge: HOME | End: 2020-02-18

## 2020-02-18 LAB — XX-LABCORP SPECIMEN COL,LCBCF: NORMAL

## 2020-02-18 PROCEDURE — 99001 SPECIMEN HANDLING PT-LAB: CPT

## 2020-10-14 ENCOUNTER — HOSPITAL ENCOUNTER (OUTPATIENT)
Dept: MAMMOGRAPHY | Age: 60
Discharge: HOME | End: 2020-10-14
Attending: FAMILY MEDICINE
Payer: MEDICAID

## 2020-10-14 DIAGNOSIS — Z12.31 VISIT FOR SCREENING MAMMOGRAM: ICD-10-CM

## 2020-10-14 PROCEDURE — 77063 BREAST TOMOSYNTHESIS BI: CPT

## 2020-10-15 ENCOUNTER — TRANSCRIBE ORDER (OUTPATIENT)
Dept: SCHEDULING | Age: 60
End: 2020-10-15

## 2020-10-15 DIAGNOSIS — R92.8 ABNORMAL MAMMOGRAM: Primary | ICD-10-CM

## 2020-11-18 ENCOUNTER — HOSPITAL ENCOUNTER (OUTPATIENT)
Dept: ULTRASOUND IMAGING | Age: 60
Discharge: HOME | End: 2020-11-18
Attending: FAMILY MEDICINE
Payer: MEDICAID

## 2020-11-18 ENCOUNTER — HOSPITAL ENCOUNTER (OUTPATIENT)
Dept: MAMMOGRAPHY | Age: 60
Discharge: HOME | End: 2020-11-18
Attending: FAMILY MEDICINE
Payer: MEDICAID

## 2020-11-18 DIAGNOSIS — R92.8 ABNORMAL MAMMOGRAM: ICD-10-CM

## 2020-11-18 PROCEDURE — 76642 ULTRASOUND BREAST LIMITED: CPT

## 2020-11-18 PROCEDURE — 77061 BREAST TOMOSYNTHESIS UNI: CPT

## 2020-11-24 ENCOUNTER — HOSPITAL ENCOUNTER (OUTPATIENT)
Dept: ULTRASOUND IMAGING | Age: 60
Discharge: HOME | End: 2020-11-24
Attending: FAMILY MEDICINE
Payer: MEDICAID

## 2020-11-24 ENCOUNTER — HOSPITAL ENCOUNTER (OUTPATIENT)
Dept: MAMMOGRAPHY | Age: 60
Discharge: HOME | End: 2020-11-24
Attending: FAMILY MEDICINE
Payer: MEDICAID

## 2020-11-24 DIAGNOSIS — R92.8 ABNORMAL MAMMOGRAM: ICD-10-CM

## 2020-11-24 DIAGNOSIS — N63.0 LUMP OR MASS IN BREAST: ICD-10-CM

## 2020-11-24 PROCEDURE — 19083 BX BREAST 1ST LESION US IMAG: CPT

## 2020-11-24 PROCEDURE — 74011000250 HC RX REV CODE- 250: Performed by: FAMILY MEDICINE

## 2020-11-24 PROCEDURE — 88305 TISSUE EXAM BY PATHOLOGIST: CPT

## 2020-11-24 PROCEDURE — 77065 DX MAMMO INCL CAD UNI: CPT

## 2020-11-24 RX ORDER — LIDOCAINE HYDROCHLORIDE 10 MG/ML
5 INJECTION, SOLUTION EPIDURAL; INFILTRATION; INTRACAUDAL; PERINEURAL
Status: COMPLETED | OUTPATIENT
Start: 2020-11-24 | End: 2020-11-24

## 2020-11-24 RX ORDER — LIDOCAINE HYDROCHLORIDE AND EPINEPHRINE 10; 10 MG/ML; UG/ML
1.5 INJECTION, SOLUTION INFILTRATION; PERINEURAL
Status: COMPLETED | OUTPATIENT
Start: 2020-11-24 | End: 2020-11-24

## 2020-11-24 RX ADMIN — LIDOCAINE HYDROCHLORIDE 5 ML: 10 INJECTION, SOLUTION EPIDURAL; INFILTRATION; INTRACAUDAL; PERINEURAL at 14:30

## 2020-11-24 RX ADMIN — LIDOCAINE HYDROCHLORIDE AND EPINEPHRINE 15 MG: 10; 10 INJECTION, SOLUTION INFILTRATION; PERINEURAL at 14:31

## 2020-11-27 ENCOUNTER — TELEPHONE (OUTPATIENT)
Dept: MAMMOGRAPHY | Age: 60
End: 2020-11-27

## 2020-11-27 NOTE — TELEPHONE ENCOUNTER
Called Ms. Chinmay Durand to inform her of benign breast biopsy results. Explained pathology findings and recommendation for follow up diagnostic mammogram in 6 months. She expressed understanding and her results [facility] has been cancelled.

## 2021-05-18 ENCOUNTER — TRANSCRIBE ORDER (OUTPATIENT)
Dept: SCHEDULING | Age: 61
End: 2021-05-18

## 2021-05-18 DIAGNOSIS — N63.10 BREAST MASS, RIGHT: Primary | ICD-10-CM

## 2021-05-26 ENCOUNTER — HOSPITAL ENCOUNTER (OUTPATIENT)
Dept: MAMMOGRAPHY | Age: 61
Discharge: HOME | End: 2021-05-26
Attending: FAMILY MEDICINE
Payer: MEDICAID

## 2021-05-26 ENCOUNTER — HOSPITAL ENCOUNTER (OUTPATIENT)
Dept: ULTRASOUND IMAGING | Age: 61
Discharge: HOME | End: 2021-05-26
Attending: FAMILY MEDICINE
Payer: MEDICAID

## 2021-05-26 DIAGNOSIS — N63.10 BREAST MASS, RIGHT: ICD-10-CM

## 2021-05-26 PROCEDURE — 77061 BREAST TOMOSYNTHESIS UNI: CPT

## 2021-05-26 PROCEDURE — 76642 ULTRASOUND BREAST LIMITED: CPT

## 2021-08-27 ENCOUNTER — TRANSCRIBE ORDER (OUTPATIENT)
Dept: SCHEDULING | Age: 61
End: 2021-08-27

## 2021-08-27 DIAGNOSIS — N63.0 BREAST MASS: Primary | ICD-10-CM

## 2021-09-10 ENCOUNTER — HOSPITAL ENCOUNTER (OUTPATIENT)
Dept: ULTRASOUND IMAGING | Age: 61
Discharge: HOME | End: 2021-09-10
Attending: STUDENT IN AN ORGANIZED HEALTH CARE EDUCATION/TRAINING PROGRAM
Payer: MEDICAID

## 2021-09-10 ENCOUNTER — HOSPITAL ENCOUNTER (OUTPATIENT)
Dept: MAMMOGRAPHY | Age: 61
Discharge: HOME | End: 2021-09-10
Attending: STUDENT IN AN ORGANIZED HEALTH CARE EDUCATION/TRAINING PROGRAM
Payer: MEDICAID

## 2021-09-10 DIAGNOSIS — N63.0 BREAST MASS: ICD-10-CM

## 2021-09-10 PROCEDURE — 77066 DX MAMMO INCL CAD BI: CPT

## 2021-09-10 PROCEDURE — 77062 BREAST TOMOSYNTHESIS BI: CPT

## 2021-09-10 PROCEDURE — 76642 ULTRASOUND BREAST LIMITED: CPT

## 2022-03-19 PROBLEM — E66.01 SEVERE OBESITY (HCC): Status: ACTIVE | Noted: 2019-05-21

## 2022-03-30 ENCOUNTER — OFFICE VISIT (OUTPATIENT)
Dept: ORTHOPEDIC SURGERY | Age: 62
End: 2022-03-30
Payer: MEDICARE

## 2022-03-30 VITALS — BODY MASS INDEX: 37.49 KG/M2 | HEART RATE: 69 BPM | OXYGEN SATURATION: 96 % | WEIGHT: 225 LBS | HEIGHT: 65 IN

## 2022-03-30 DIAGNOSIS — M25.571 CHRONIC PAIN OF BOTH ANKLES: ICD-10-CM

## 2022-03-30 DIAGNOSIS — M25.572 CHRONIC PAIN OF BOTH ANKLES: ICD-10-CM

## 2022-03-30 DIAGNOSIS — G89.29 CHRONIC PAIN OF BOTH ANKLES: ICD-10-CM

## 2022-03-30 DIAGNOSIS — M72.2 PLANTAR FASCIA SYNDROME: Primary | ICD-10-CM

## 2022-03-30 PROCEDURE — 99213 OFFICE O/P EST LOW 20 MIN: CPT | Performed by: ORTHOPAEDIC SURGERY

## 2022-03-30 PROCEDURE — 73610 X-RAY EXAM OF ANKLE: CPT | Performed by: ORTHOPAEDIC SURGERY

## 2022-03-30 PROCEDURE — G8417 CALC BMI ABV UP PARAM F/U: HCPCS | Performed by: ORTHOPAEDIC SURGERY

## 2022-03-30 PROCEDURE — 3017F COLORECTAL CA SCREEN DOC REV: CPT | Performed by: ORTHOPAEDIC SURGERY

## 2022-03-30 PROCEDURE — G8427 DOCREV CUR MEDS BY ELIG CLIN: HCPCS | Performed by: ORTHOPAEDIC SURGERY

## 2022-03-30 PROCEDURE — G9899 SCRN MAM PERF RSLTS DOC: HCPCS | Performed by: ORTHOPAEDIC SURGERY

## 2022-03-30 PROCEDURE — G8432 DEP SCR NOT DOC, RNG: HCPCS | Performed by: ORTHOPAEDIC SURGERY

## 2022-03-30 NOTE — PROGRESS NOTES
AMBULATORY PROGRESS NOTE      Patient: Jaclyn Wilkes             MRN: [id number]     SSN: [id number] Body mass index is 37.44 kg/m². YOB: 1960     AGE: 62 y.o. EX: female    PCP: Alfredo Kuhn MD       IMPRESSION //  DIAGNOSIS AND TREATMENT PLAN        Jaclyn Wilkes has a diagnosis of:      DIAGNOSES    1. Plantar fascia syndrome    2. Chronic pain of both ankles        Orders Placed This Encounter    Generic Supply Order     Right Custom Trilaminar orthotic w/ metatarsal pad ([employer])    AMB POC XRAY, ANKLE; COMPLETE, 3+ VIE     Order Specific Question:   Reason for Exam     Answer:   PAIN    AMB POC XRAY, ANKLE; COMPLETE, 3+ VIE     ASK ALL FEMALE PATIENTS IF THEY ARE PREGNANT     Order Specific Question:   Reason for Exam     Answer:   PAIN            PLAN:    1. DME: Custom Trilaminar Orthotic w/ metatarsal pad  2. Obtain 3-View XR of both feet      RTO 7 weeks    There are no Patient Instructions on file for this visit. Please follow up with your PCP for any health maintenance as recommended         Jaclyn Wilkes  expresses understanding of the diagnosis, treatment plan, and all of their proposed questions were answered to their satisfaction. Patient education has been provided re the diagnoses. HPI //  [address] [place] [address] Gómez IS A 62 y.o. female who is a/an  new patient, presenting to my outpatient office for evaluation of  the following chief complaint(s):     Chief Complaint   Patient presents with    Ankle Pain     both ankles       Jaclyn Wilkes presents today w/ posterior hindfoot pain. She mentions she worked 40-60 hours in a position that required a lot of standing for 10 years. She is currently retired. She is retired and on disability through [employer].     Visit Vitals  Pulse 69   Ht 5' 5\" (1.651 m)   Wt 225 lb (102.1 kg)   SpO2 96%   BMI 37.44 kg/m²       Appearance: Alert, well appearing and pleasant patient who is in no distress, oriented to person, place/time, and who follows commands. Normal dress/motor activity/thought processes/memory. This patient is accompanied in the examination room by her  self. Patient arrives to office via: without assistive device:     Psychiatric:  Normal Affect/mood. Judgement, behavior, and conduct are appropriate. Speech normal in context and clarity, memory intact grossly, no involuntary movements - tremors. H EENT (2): Head normocephalic & atraumatic. Eye: pupils are round// EOM are intact // Neck: ROM WNL  // Hearings Intact   Respiratory: Breathing non labored     ANKLE/FOOT right    Gait: normal  Tenderness: mild over  plantar fascia  hindfoot  Cutaneous:  WNL. Joint Motion:  WNL   Joint / Tendon Stability:  No Ankle or Subtalar instability or joint laxity. No peroneal sublux ability or dislocation  Alignment: neutral Hindfoot,    Neuro Motor/Sensory: NL/NL  Vascular: NL foot/ankle pulses,   Lymphatics: No extremity lymphedema, No calf swelling, no tenderness to calf muscles. CHART REVIEW     Jody Blood has been experiencing pain and discomfort confirmed as outlined in the pain assessment outlined below.  was reviewed by Jose Antonio Leslie MD on 3/30/2022. Pain Assessment  3/30/2022   Location of Pain Ankle   Location Modifiers Left;Right   Severity of Pain -   Quality of Pain -   Duration of Pain -   Frequency of Pain Intermittent   Date Pain First Started (No Data)   Date Pain First Started Comment 3 or 4 months ago   Aggravating Factors (No Data)   Aggravating Factors Comment driving   Limiting Behavior Some   Relieving Factors (No Data)   Relieving Factors Comment soak in hot water   Result of Injury No        Charles Adam Prakash  has a past medical history of Anemia NEC, Aneurysm ([address] Cancer ([address] History of colonic polyps, Hypertension, Seizures ([address] and Tendency toward bleeding easily ([address]      Patients is employed at:          has a past medical history of Anemia NEC, Aneurysm ([facility] Cancer ([facility] History of colonic polyps, Hypertension, Seizures ([facility] and Tendency toward bleeding easily ([facility] has a past surgical history that includes hx other surgical; hx other surgical; hx tubal ligation; hx gi; hx breast reduction; and hx breast biopsy. family history includes Breast Cancer in her mother. Current Outpatient Medications   Medication Sig    DULoxetine (CYMBALTA) 30 mg capsule Take 30 mg by mouth daily.  omeprazole (PRILOSEC) 20 mg capsule Take 20 mg by mouth daily.  gabapentin (NEURONTIN) 300 mg capsule Take 300 mg by mouth three (3) times daily.  levothyroxine (SYNTHROID) 50 mcg tablet Take  by mouth Daily (before breakfast).  hydroxychloroquine (PLAQUENIL) 200 mg tablet Take 200 mg by mouth daily.  hydroCHLOROthiazide (HYDRODIURIL) 25 mg tablet Take 25 mg by mouth daily.  amLODIPine (NORVASC) 5 mg tablet Take 5 mg by mouth daily.  raNITIdine hcl 150 mg capsule Take 150 mg by mouth two (2) times a day.  multivitamin (ONE A DAY) tablet Take 1 Tab by mouth daily.  psyllium (METAMUCIL) 0.52 gram capsule Take 1 Cap by mouth daily.  PROPRANOLOL HCL (PROPRANOLOL PO) Take  by mouth.  DIPHENOXYLATE HCL/ATROPINE (DIPHENOXYLATE-ATROPINE PO) Take  by mouth.  CITALOPRAM HYDROBROMIDE (CITALOPRAM PO) Take 20 mg by mouth.  DICLOFENAC POTASSIUM PO Take 75 mg by mouth.  FOLIC ACID PO Take 1 mg by mouth.  ASCORBATE CALCIUM (VITAMIN C PO) Take  by mouth.  MULTIVITS,CA,MINERALS/IRON/FA (ONE-A-DAY WOMENS FORMULA PO) Take  by mouth.  NAPROXEN SODIUM (ALEVE PO) Take  by mouth.  zolpidem (AMBIEN) 10 mg tablet Take  by mouth nightly as needed for Sleep. (Patient not taking: Reported on 3/30/2022)    traMADol (ULTRAM) 50 mg tablet Take 50 mg by mouth every six (6) hours as needed for Pain. (Patient not taking: Reported on 3/30/2022)    METRONIDAZOLE PO Take  by mouth. (Patient not taking: Reported on 3/30/2022)     No current facility-administered medications for this visit. Allergies   Allergen Reactions    Aspirin (Bulk) Not Reported This Time    Contrast Agent [Iodine] Not Reported This Time     \"x ray dye\"    Dilantin [Phenytoin Sodium Extended] Not Reported This Time     Social History     Occupational History    Not on file   Tobacco Use    Smoking status: Former Smoker     Quit date: 1993     Years since quittin.8    Smokeless tobacco: Never Used   Substance and Sexual Activity    Alcohol use: Not on file    Drug use: Not on file    Sexual activity: Not on file       reports that she quit smoking about 28 years ago. She has never used smokeless tobacco.      DIAGNOSTIC LAB DATA      No results found for: HBA1C, KPU4VINW, [id number] //   Lab Results   Component Value Date/Time    Glucose 93 2016 12:05 PM        No results found for: RJA0HLHH, IRX3BKPQ      Lab Results   Component Value Date/Time    Vitamin D 25-Hydroxy 26 (L) 2010 11:53 AM    VITAMIN D, 25-HYDROXY 26 (L) 2016 12:05 PM        Drug Screen Most Recent Result Date    No resulted procedures found. REVIEW OF SYSTEMS : 3/30/2022  ALL BELOW ARE Negative except : SEE HPI     All other systems reviewed and are negative. 12 point review of systems otherwise negative unless noted in HPI. RADIOGRAPHS// IMAGING//DIAGNOSTIC DATA     Orders Placed This Encounter    Generic Supply Order    AMB POC XRAY, ANKLE; COMPLETE, 3+ VIE    AMB POC XRAY, ANKLE; COMPLETE, 3+ VIE        ANKLE X RAYS 3 VIEWS Right   X RAYS AT [address] [place] [address]  3/30/2022    NON WEIGHT BEARING    X RAYS AT [address] [place] [address]  3/30/2022    Bones: No fractures or dislocations. No focal osteolytic or osteoblastic process     Bone Spurs: No significant bone spurs  Alignment: Ankle mortise alignment is congruent, Tibial plafond and talar dome intact.      No Osteochondral defects seen Joint: No Significant OA changes present  Soft Tissues: Normal, No radiopaque foreign body     No abnormal calcific densities to soft tissues    No ankle joint effusion in lateral projection. Mineralization: Suggests no Osteopenia    I have personally reviewed the results of the above study. The interpretation of this study is my professional opinion         ANKLE X RAYS 3 VIEWS LEFT  X RAYS AT [address] [place] [address]  3/30/2022    NON WEIGHT BEARING    X RAYS AT [address] [place] [address]  3/30/2022    Bones: No fractures or dislocations. No focal osteolytic or osteoblastic process     Bone Spurs: No significant bone spurs  Alignment: Ankle mortise alignment is congruent, Tibial plafond and talar dome intact. No Osteochondral defects seen   Joint: No Significant OA changes present  Soft Tissues: Normal, No radiopaque foreign body     No abnormal calcific densities to soft tissues    No ankle joint effusion in lateral projection. Mineralization: Suggests no Osteopenia    I have personally reviewed the results of the above study. The interpretation of this study is my professional opinion    I have personally reviewed the images of the above study. The interpretation of this study is my professional opinion             I have spent 25 minutes reviewing the previous notes, reviewing diagnostic studies [Advanced  Imaging, Diagnostic test results (x-rays)] and had a direct face to face with the patient discussing the diagnosis and importance of compliance with the treatment and plan. There is  discussion for the potential for surgery, answering all questions, as well as documenting patient care coordination for this individual on the day of the visit. Disclaimer:     Sections of this note are dictated using utilizing voice recognition software, which may have resulted in some phonetic based errors in grammar and contents.  Even though attempts were made to correct all the mistakes, some may have been missed, and remained in the body of the document. If questions arise, please contact our department. An electronic signature was used to authenticate this note. Payal Monson may have a reminder for a \"due or due soon\" health maintenance. I have asked that she contact her primary care provider for follow-up on this health maintenance. There are no Patient Instructions on file for this visit. Please follow up with your PCP for any health maintenance as recommended.               Alfred Marte, as dictated bySheeba  3/30/2022  7:45 AM

## 2022-07-18 ENCOUNTER — OFFICE VISIT (OUTPATIENT)
Dept: ORTHOPEDIC SURGERY | Age: 62
End: 2022-07-18
Payer: MEDICARE

## 2022-07-18 VITALS — HEIGHT: 65 IN | WEIGHT: 225 LBS | BODY MASS INDEX: 37.49 KG/M2

## 2022-07-18 DIAGNOSIS — M79.7 FIBROMYALGIA: ICD-10-CM

## 2022-07-18 DIAGNOSIS — G89.29 CHRONIC PAIN OF BOTH ANKLES: ICD-10-CM

## 2022-07-18 DIAGNOSIS — M76.61 ACHILLES TENDINITIS OF BOTH LOWER EXTREMITIES: ICD-10-CM

## 2022-07-18 DIAGNOSIS — M25.572 CHRONIC PAIN OF BOTH ANKLES: ICD-10-CM

## 2022-07-18 DIAGNOSIS — M25.571 CHRONIC PAIN OF BOTH ANKLES: ICD-10-CM

## 2022-07-18 DIAGNOSIS — M76.62 ACHILLES TENDINITIS OF BOTH LOWER EXTREMITIES: ICD-10-CM

## 2022-07-18 DIAGNOSIS — Z90.49 STATUS POST TOTAL COLECTOMY: ICD-10-CM

## 2022-07-18 DIAGNOSIS — M72.2 PLANTAR FASCIA SYNDROME: Primary | ICD-10-CM

## 2022-07-18 PROCEDURE — G8432 DEP SCR NOT DOC, RNG: HCPCS | Performed by: ORTHOPAEDIC SURGERY

## 2022-07-18 PROCEDURE — 99213 OFFICE O/P EST LOW 20 MIN: CPT | Performed by: ORTHOPAEDIC SURGERY

## 2022-07-18 PROCEDURE — G8417 CALC BMI ABV UP PARAM F/U: HCPCS | Performed by: ORTHOPAEDIC SURGERY

## 2022-07-18 PROCEDURE — G9899 SCRN MAM PERF RSLTS DOC: HCPCS | Performed by: ORTHOPAEDIC SURGERY

## 2022-07-18 PROCEDURE — G8427 DOCREV CUR MEDS BY ELIG CLIN: HCPCS | Performed by: ORTHOPAEDIC SURGERY

## 2022-07-18 PROCEDURE — 3017F COLORECTAL CA SCREEN DOC REV: CPT | Performed by: ORTHOPAEDIC SURGERY

## 2022-07-18 NOTE — PATIENT INSTRUCTIONS
Runner's stretch with gentle push-off, 10 reps each side daily in the morning. Try massage with medicated ointment over the Achilles tendon. Use ice on your ankle as needed for severe pain.

## 2022-07-18 NOTE — PROGRESS NOTES
AMBULATORY PROGRESS NOTE      Patient: Clark Su             MRN: [id number]     SSN: [id number] Body mass index is 37.44 kg/m². YOB: 1960     AGE: 62 y.o. EX: female    PCP: Demarco Haque MD       IMPRESSION //  DIAGNOSIS AND TREATMENT PLAN      Clark Su has a diagnosis of:      DIAGNOSES    1. Plantar fascia syndrome    2. Chronic pain of both ankles    3. Achilles tendinitis of both lower extremities    4. Fibromyalgia    5. Status post total colectomy        Orders Placed This Encounter    Generic Supply Order     Dorsal night splint    Generic Supply Order     Left custom trilaminar orthotic, medially posted, with goal to offload the Achilles tendon-  Orthotics          PLAN:    1. DME: Dorsal night splint  2. DME: Left custom trilaminar orthotic, medially posted, with goal to offload the Achilles tendon  3. AVS: Runner's stretch with gentle push-off, 10 reps each side daily in the morning /Try massage with medicated ointment over the Achilles tendon. / Use ice on your ankle as needed for severe pain. 4. I anticipate referring her to physical therapy if her pain does not improve. RTO 2 months    Patient Instructions   Runner's stretch with gentle push-off, 10 reps each side daily in the morning. Try massage with medicated ointment over the Achilles tendon. Use ice on your ankle as needed for severe pain. Follow-up and Dispositions  ·   Return in about 3 months (around 10/18/2022). Please follow up with your PCP for any health maintenance as recommended         Clark Su  expresses understanding of the diagnosis, treatment plan, and all of their proposed questions were answered to their satisfaction. Patient education has been provided re the diagnoses.          HPI //  [address] [facility] A 62 y.o. female who is a/an  established patient, presenting to my outpatient office for evaluation of  the following chief complaint(s):     Chief Complaint   Patient presents with    Foot Pain     bilat     At LOV, Oscar Wyatt presented with right plantar fascia syndrome. I ordered DME: custom trilaminar orthotic with metatarsal pad. Since LOV, Oscar Wyatt continues to endorse right heel pain. She complains of continued pain in the plantar and posterior right heel and right posterior ankle, exacerbated with startup and driving. She notes that her right heel pain began in 05/2022. She also notes incipient pain in the left posterior heel and ankle. She states that she keeps her legs elevated when at rest and takes Tylenol as needed for pain relief. She mentions a history of fibromyalgia and notes she is s/p total colectomy. She further states that she is no longer taking Naprosyn or Voltaren. Visit Vitals  Ht 5' 5\" (1.651 m)   Wt 225 lb (102.1 kg)   BMI 37.44 kg/m²       Appearance: Alert, well appearing and pleasant patient who is in no distress, oriented to person, place/time, and who follows commands. Normal dress/motor activity/thought processes/memory. This patient is accompanied in the examination room by her  self. Patient arrives to office via: without assistive device. Psychiatric:  Normal Affect/mood. Judgement, behavior, and conduct are appropriate. Speech normal in context and clarity, memory intact grossly, no involuntary movements - tremors. H EENT (2): Head normocephalic & atraumatic. Eye: pupils are round// EOM are intact // Neck: ROM WNL  // Hearings Intact   Respiratory: Breathing non labored     ANKLE/FOOT right    Gait: uses assistive device -right custom trilaminar orthotic, left Specno   Tenderness: mild over the original and posterior plantar fascia and insertional Achilles tendon. Exquisite over the noninsertional Achilles tendon. Cutaneous: WNL. Joint Motion:  WNL   Joint / Tendon Stability:  No Ankle or Subtalar instability or joint laxity. No peroneal sublux ability or dislocation  Alignment: neutral Hindfoot,    Neuro Motor/Sensory: NL/NL  Vascular: NL foot/ankle pulses,   Lymphatics: No extremity lymphedema, No calf swelling, no tenderness to calf muscles. CHART REVIEW     Wilmar Hammer has been experiencing pain and discomfort confirmed as outlined in the pain assessment outlined below.  was reviewed by Reynaldo Hammer MD on 7/18/2022. Pain Assessment  7/18/2022   Location of Pain Foot   Location Modifiers Right;Left   Severity of Pain 8   Quality of Pain Dull;Aching   Duration of Pain -   Frequency of Pain Intermittent   Date Pain First Started -   Date Pain First Started Comment -   Aggravating Factors Walking   Aggravating Factors Comment -   Limiting Behavior -   Relieving Factors -   Relieving Factors Comment -   Result of Injury -        Wilmar Hammer  has a past medical history of Anemia NEC, Aneurysm ([address] Cancer ([address] History of colonic polyps, Hypertension, Seizures ([address] and Tendency toward bleeding easily ([address] Patients is employed at:          has a past medical history of Anemia NEC, Aneurysm ([address] Cancer ([address] History of colonic polyps, Hypertension, Seizures ([address] and Tendency toward bleeding easily ([address] has a past surgical history that includes hx other surgical; hx other surgical; hx tubal ligation; hx gi; hx breast reduction; and hx breast biopsy. family history includes Breast Cancer in her mother. Current Outpatient Medications   Medication Sig    omeprazole (PRILOSEC) 20 mg capsule Take 20 mg by mouth daily.  gabapentin (NEURONTIN) 300 mg capsule Take 300 mg by mouth three (3) times daily.  levothyroxine (SYNTHROID) 50 mcg tablet Take  by mouth Daily (before breakfast).  hydroxychloroquine (PLAQUENIL) 200 mg tablet Take 200 mg by mouth daily.  hydroCHLOROthiazide (HYDRODIURIL) 25 mg tablet Take 25 mg by mouth daily.     amLODIPine (NORVASC) 5 mg tablet Take 5 mg by mouth daily.  raNITIdine hcl 150 mg capsule Take 150 mg by mouth two (2) times a day.  multivitamin (ONE A DAY) tablet Take 1 Tab by mouth daily.  psyllium (METAMUCIL) 0.52 gram capsule Take 1 Cap by mouth daily.  PROPRANOLOL HCL (PROPRANOLOL PO) Take  by mouth.  DIPHENOXYLATE HCL/ATROPINE (DIPHENOXYLATE-ATROPINE PO) Take  by mouth.  CITALOPRAM HYDROBROMIDE (CITALOPRAM PO) Take 20 mg by mouth.  DICLOFENAC POTASSIUM PO Take 75 mg by mouth.  FOLIC ACID PO Take 1 mg by mouth.  ASCORBATE CALCIUM (VITAMIN C PO) Take  by mouth.  MULTIVITS,CA,MINERALS/IRON/FA (ONE-A-DAY WOMENS FORMULA PO) Take  by mouth.  NAPROXEN SODIUM (ALEVE PO) Take  by mouth.  DULoxetine (CYMBALTA) 30 mg capsule Take 30 mg by mouth daily. (Patient not taking: Reported on 2022)    zolpidem (AMBIEN) 10 mg tablet Take  by mouth nightly as needed for Sleep. (Patient not taking: Reported on 3/30/2022)    traMADol (ULTRAM) 50 mg tablet Take 50 mg by mouth every six (6) hours as needed for Pain. (Patient not taking: Reported on 3/30/2022)    METRONIDAZOLE PO Take  by mouth. (Patient not taking: Reported on 3/30/2022)     No current facility-administered medications for this visit. Allergies   Allergen Reactions    Aspirin (Bulk) Not Reported This Time    Contrast Agent [Iodine] Not Reported This Time     \"x ray dye\"    Dilantin [Phenytoin Sodium Extended] Not Reported This Time     Social History     Occupational History    Not on file   Tobacco Use    Smoking status: Former Smoker     Quit date: 1993     Years since quittin.1    Smokeless tobacco: Never Used   Substance and Sexual Activity    Alcohol use: Not on file    Drug use: Not on file    Sexual activity: Not on file       reports that she quit smoking about 29 years ago.  She has never used smokeless tobacco.      DIAGNOSTIC LAB DATA      No results found for: HBA1C, [id number], [id number] //   Lab Results Component Value Date/Time    Glucose 93 04/21/2016 12:05 PM        No results found for: [id number], [id number]      Lab Results   Component Value Date/Time    Vitamin D 25-Hydroxy 26 (L) 01/21/2010 11:53 AM    VITAMIN D, 25-HYDROXY 26 (L) 04/21/2016 12:05 PM        Drug Screen Most Recent Result Date    No resulted procedures found. REVIEW OF SYSTEMS : 7/18/2022  ALL BELOW ARE Negative except : SEE HPI     All other systems reviewed and are negative. 12 point review of systems otherwise negative unless noted in HPI. RADIOGRAPHS// IMAGING//DIAGNOSTIC DATA     Orders Placed This Encounter    Generic Supply Order    Generic Supply Order        No notes on file      I have personally reviewed the images of the above study. The interpretation of this study is my professional opinion           I have spent 25 minutes reviewing the previous notes, reviewing diagnostic studies [Advanced  Imaging, Diagnostic test results (x-rays)] and had a direct face to face with the patient discussing the diagnosis and importance of compliance with the treatment and plan. There is  discussion for the potential for surgery, answering all questions, as well as documenting patient care coordination for this individual on the day of the visit. Disclaimer:     Sections of this note are dictated using utilizing voice recognition software, which may have resulted in some phonetic based errors in grammar and contents. Even though attempts were made to correct all the mistakes, some may have been missed, and remained in the body of the document. If questions arise, please contact our department. An electronic signature was used to authenticate this note. Letty Jaimes may have a reminder for a \"due or due soon\" health maintenance. I have asked that she contact her primary care provider for follow-up on this health maintenance.     Patient Instructions   Runner's stretch with gentle push-off, 10 reps each side daily in the morning. Try massage with medicated ointment over the Achilles tendon. Use ice on your ankle as needed for severe pain. Follow-up and Dispositions  ·   Return in about 3 months (around 10/18/2022). Please follow up with your PCP for any health maintenance as recommended.                 Scribed by Olivia Heath, as dictated by Lela Gonzalez MD.   7/18/2022  10:04 AM

## 2022-09-06 ENCOUNTER — TRANSCRIBE ORDER (OUTPATIENT)
Dept: SCHEDULING | Age: 62
End: 2022-09-06

## 2022-09-06 DIAGNOSIS — N64.4 BREAST PAIN: Primary | ICD-10-CM

## 2022-09-12 ENCOUNTER — TRANSCRIBE ORDER (OUTPATIENT)
Dept: SCHEDULING | Age: 62
End: 2022-09-12

## 2022-09-12 DIAGNOSIS — N64.4 BREAST PAIN: Primary | ICD-10-CM

## 2022-09-27 ENCOUNTER — HOSPITAL ENCOUNTER (OUTPATIENT)
Dept: ULTRASOUND IMAGING | Age: 62
Discharge: HOME | End: 2022-09-27
Attending: FAMILY MEDICINE
Payer: MEDICARE

## 2022-09-27 ENCOUNTER — HOSPITAL ENCOUNTER (OUTPATIENT)
Dept: MAMMOGRAPHY | Age: 62
Discharge: HOME | End: 2022-09-27
Attending: FAMILY MEDICINE
Payer: MEDICARE

## 2022-09-27 DIAGNOSIS — N64.4 BREAST PAIN: ICD-10-CM

## 2022-09-27 PROCEDURE — 76642 ULTRASOUND BREAST LIMITED: CPT

## 2022-09-27 PROCEDURE — 77062 BREAST TOMOSYNTHESIS BI: CPT

## 2023-09-12 ENCOUNTER — TRANSCRIBE ORDERS (OUTPATIENT)
Facility: HOSPITAL | Age: 63
End: 2023-09-12

## 2023-09-12 DIAGNOSIS — Z12.31 VISIT FOR SCREENING MAMMOGRAM: Primary | ICD-10-CM

## 2023-09-29 ENCOUNTER — HOSPITAL ENCOUNTER (OUTPATIENT)
Facility: HOSPITAL | Age: 63
Discharge: HOME | End: 2023-09-29
Payer: COMMERCIAL

## 2023-09-29 VITALS — WEIGHT: 225 LBS | BODY MASS INDEX: 37.49 KG/M2 | HEIGHT: 65 IN

## 2023-09-29 DIAGNOSIS — Z12.31 VISIT FOR SCREENING MAMMOGRAM: ICD-10-CM

## 2023-09-29 PROCEDURE — 77063 BREAST TOMOSYNTHESIS BI: CPT

## 2024-01-05 ENCOUNTER — HOSPITAL ENCOUNTER (OUTPATIENT)
Facility: HOSPITAL | Age: 64
Setting detail: RECURRING SERIES
Discharge: HOME | End: 2024-01-08
Payer: COMMERCIAL

## 2024-01-05 PROCEDURE — 97110 THERAPEUTIC EXERCISES: CPT

## 2024-01-05 PROCEDURE — 97535 SELF CARE MNGMENT TRAINING: CPT

## 2024-01-05 PROCEDURE — 97162 PT EVAL MOD COMPLEX 30 MIN: CPT

## 2024-01-05 NOTE — PROGRESS NOTES
PT DAILY TREATMENT NOTE/ANKLE [id number]      Patient Name: Jason Liang    Date: 2024    : 1960  Insurance: Payor: [employer] / Plan: [employer] / Product Type: *No Product type* /      Patient  verified yes     Visit #   Current / Total 1 24   Time   In / Out 10:25 11:14   Pain   In / Out 8 7   Subjective Functional Status/Changes: See Subjective Summary     Treatment Area: Pain in right ankle [M25.571]  Pain in left ankle [M25.572]  Pain in right foot [M79.671]  Pain in left foot [M79.672]    SUBJECTIVE    Subjective functional status/changes:     Chief Complaint: bilateral ankle and plantar foot pain  History/Mechanism of Injury: insidious onset of plantar foot pain with progression to include bilateral achilles pain  Current Symptoms/Functional Deficits: difficulty driving, sleep disturbances, limited standing tolerance of 2 hours, limited walking tolerance of about 30 minutes, modified dressing tasks to sitting performance, impaired balance with close falls, difficulty with stairs, decreased performance of moderate household chores, reduced ability with heavy household work, decreased ability to participate in recreational activities, increased independence on SPC  Alleviating/Aggravating factors: movement  Pain-  Current: 8/10     Worst: 10/10   Best: 2/10  Previous Treatment/Compliance: right sided in sole   Mobility Devices: SPC available but doesn't use regularly  PMHx/Surgical Hx: RA, fibromyalgia, mixed connective tissue disorder, hx of CA, HTN, hx of seizures, hx of aneurysm, arthritis    Diagnostic Tests: [] Lab work [] X-rays    [] CT [] MRI     [] Other:  Results:    Work Hx: disability  Living Situation: Lives with spouse in a 1 story home with 3 stairs to enter  Household Modifications: shower rails  Hobbies: travel, spending time with grandchildren, recreational walking, gym activities  PLOF: Previously functionally independent with ADLs, able to perform household 
(P)  Barriers to Learning/Limitations: none  Measures taken if barriers to learning present: n/a  Patient Goal (s): \"to have relief from pain when standing and walking\" & \"to be able to keep up with my grandchildren\"   Patient Self Reported Health Status: fair  Rehabilitation Potential: fair    Short Term Goals: To be accomplished in 4 treatments:  1- Goal: Pt to be compliant with initial HEP to improve performance of ADLs.  Status at last note/certification: Established and reviewed with pt  2- Goal: Pt to improve bilateral dorsiflexion and plantarflexion AROM with knee flexed by > 10 degrees to reduce gait deviations when negotiating stairs.   Status at last note/certification:                                                         AROM (deg)  Right Left   Dorsiflexion with Knee Flexed 5 3   Plantar Flexion 35 25       Long Term Goals: To be accomplished in 16 treatments:  1- Goal: Pt to improve bilateral SLS to > 45 seconds with minimal deviations to increase single limb stability and reduce gait deviations when walking  Status at last note/certification: right = 8.5 seconds, left = 30 seconds   2- Goal: Pt to improve bilateral ankle strength to > 4+/5 globally to increase ability to participate in recreational activities.  Status at last note/certification:    Strength: Right (/5) Left (/5)   Ankle   Dorsiflexion 4+ 4+               PF 11 (reps) 1/2 range 7 (reps)  1/3 range               Inversion 4 4               Eversion 4 4     3- Goal: Pt to improve bilateral great toe extension to > 50 degrees to improve tolerance for prolonged walking activities at home and when traveling.  Status at last note/certification:                  AROM (deg) Right Left   Great Toe Extension 23 25     4- Goal: Pt to report < 5/10 pain at worst to increase ease with ADLs.  Status at last note/certification: 10/10 pain at worst  5- Goal: Pt to report FOTO score of 58 to show improved function and quality of life.  Status at last

## 2024-01-08 ENCOUNTER — HOSPITAL ENCOUNTER (OUTPATIENT)
Facility: HOSPITAL | Age: 64
Setting detail: RECURRING SERIES
Discharge: HOME | End: 2024-01-11
Payer: COMMERCIAL

## 2024-01-08 PROCEDURE — 97112 NEUROMUSCULAR REEDUCATION: CPT

## 2024-01-08 PROCEDURE — 97110 THERAPEUTIC EXERCISES: CPT

## 2024-01-08 PROCEDURE — 97140 MANUAL THERAPY 1/> REGIONS: CPT

## 2024-01-08 NOTE — PROGRESS NOTES
PHYSICAL / OCCUPATIONAL THERAPY - DAILY TREATMENT NOTE    Patient Name: Jason Liang    Date: 2024    : 1960  Insurance: Payor: [employer] / Plan: [employer] HE[employer] / Product Type: *No Product type* /      Patient  verified Yes     Visit #   Current / Total 2 16   Time   In / Out 11:44 12:38   Pain   In / Out 7/10 4-5/10   Subjective Functional Status/Changes: I tried doing the arch lifts at home and It caused my toes to cramp.      TREATMENT AREA =  Pain in right ankle [M25.571]  Pain in left ankle [M25.572]  Pain in right foot [M79.671]  Pain in left foot [M79.672]    OBJECTIVE  Therapeutic Procedures:    Tx Min Billable or 1:1 Min (if diff from Tx Min) Procedure, Rationale, Specifics   14 14 26388 Manual Therapy (timed):  decrease pain, increase ROM, increase tissue extensibility, and decrease trigger points to improve patient's ability to progress to PLOF and address remaining functional goals.  The manual therapy interventions were performed at a separate and distinct time from the therapeutic activities interventions . (see flow sheet as applicable)     Details if applicable:  long sitting: STM to the plantar aspects of both feet, distal peroneals, soleus, Tibialis anterior. Foot splaying. Cross friction to both achillis tendons.      15 15 97409 Neuromuscular Re-Education (timed):  improve balance, coordination, kinesthetic sense, posture, core stability and proprioception to improve patient's ability to develop conscious control of individual muscles and awareness of position of extremities in order to progress to PLOF and address remaining functional goals. (see flow sheet as applicable)     Details if applicable:     25  21309 Therapeutic Exercise (timed):  increase ROM, strength, coordination, balance, and proprioception to improve patient's ability to progress to PLOF and address remaining functional goals. (see flow sheet as applicable)     Details if applicable:

## 2024-01-10 ENCOUNTER — HOSPITAL ENCOUNTER (OUTPATIENT)
Facility: HOSPITAL | Age: 64
Setting detail: RECURRING SERIES
Discharge: HOME | End: 2024-01-13
Payer: COMMERCIAL

## 2024-01-10 PROCEDURE — 97112 NEUROMUSCULAR REEDUCATION: CPT

## 2024-01-10 PROCEDURE — 97110 THERAPEUTIC EXERCISES: CPT

## 2024-01-10 NOTE — PROGRESS NOTES
5 3   Plantar Flexion 35 25         Long Term Goals: To be accomplished in 16 treatments:  1- Goal: Pt to improve bilateral SLS to > 45 seconds with minimal deviations to increase single limb stability and reduce gait deviations when walking  Status at last note/certification: right = 8.5 seconds, left = 30 seconds   2- Goal: Pt to improve bilateral ankle strength to > 4+/5 globally to increase ability to participate in recreational activities.  Status at last note/certification:    Strength: Right (/5) Left (/5)   Ankle   Dorsiflexion 4+ 4+               PF 11 (reps) 1/2 range 7 (reps)  1/3 range               Inversion 4 4               Eversion 4 4      3- Goal: Pt to improve bilateral great toe extension to > 50 degrees to improve tolerance for prolonged walking activities at home and when traveling.  Status at last note/certification:                  AROM (deg) Right Left   Great Toe Extension 23 25      4- Goal: Pt to report < 5/10 pain at worst to increase ease with ADLs.  Status at last note/certification: 10/10 pain at worst  5- Goal: Pt to report FOTO score of 58 to show improved function and quality of life.  Status at last note/certification: FOTO 46 pts    Next PN/ RC due 2/3/24  Auth due IGNACIO    PLAN  - Continue Plan of Care  - Upgrade activities as tolerated    Mariel Sue, PT    1/10/2024    12:57 PM    Future Appointments   Date Time Provider Department Center   1/15/2024 11:40 AM Kvng Cano PTA [facility]   1/17/2024 11:00 AM ELIZABETH GONZALEZ [facility]   1/22/2024 12:20 PM Kvng Cano PTA [facility]   1/24/2024 10:20 AM Kvng Cano [facility]TA [facility]   1/29/2024 11:40 AM Kvng Cano PTA [facility]   1/31/2024 10:20 AM [facility]

## 2024-01-15 ENCOUNTER — HOSPITAL ENCOUNTER (OUTPATIENT)
Facility: HOSPITAL | Age: 64
Setting detail: RECURRING SERIES
Discharge: HOME | End: 2024-01-18
Payer: COMMERCIAL

## 2024-01-15 PROCEDURE — 97110 THERAPEUTIC EXERCISES: CPT

## 2024-01-15 PROCEDURE — 97112 NEUROMUSCULAR REEDUCATION: CPT

## 2024-01-15 NOTE — PROGRESS NOTES
PHYSICAL / OCCUPATIONAL THERAPY - DAILY TREATMENT NOTE    Patient Name: Jason Liang    Date: 1/15/2024    : 1960  Insurance: Payor: [employer] / Plan: [employer] / Product Type: *No Product type* /      Patient  verified Yes     Visit #   Current / Total 4 16   Time   In / Out 11:44 12:33   Pain   In / Out -6/10 3/10   Subjective Functional Status/Changes: The weather is aggravating my feet and ankles.       TREATMENT AREA =  Pain in right ankle [M25.571]  Pain in left ankle [M25.572]  Pain in right foot [M79.671]  Pain in left foot [M79.672]    OBJECTIVE    Modalities Rationale:     decrease pain and increase tissue extensibility to improve patient's ability to progress to PLOF and address remaining functional goals.     min [] Estim Unattended, type/location:                                      []  w/ice    []  w/heat    min [] Estim Attended, type/location:                                     []  w/US     []  w/ice    []  w/heat    []  TENS insruct      min []  Mechanical Traction: type/lbs                   []  pro   []  sup   []  int   []  cont    []  before manual    []  after manual    min []  Ultrasound, settings/location:     10+ 1 minute set up.  min  unbill []  Ice     [x]  Heat    location/position: Long sitting: bilateral ankles/ dorsum of foot.     min []  Paraffin,  details:     min []  Vasopneumatic Device, press/temp:     min []  Whirlpool / Fluido:    If using vaso (only need to measure limb vaso being performed on)      pre-treatment girth :       post-treatment girth :       measured at (landmark location) :      min []  Other:    Skin assessment post-treatment:   Intact      Therapeutic Procedures:    Tx Min Billable or 1:1 Min (if diff from Tx Min) Procedure, Rationale, Specifics   23 23 72249 Neuromuscular Re-Education (timed):  improve balance, coordination, kinesthetic sense, posture, core stability and proprioception to improve patient's ability to develop

## 2024-01-17 ENCOUNTER — APPOINTMENT (OUTPATIENT)
Facility: HOSPITAL | Age: 64
End: 2024-01-17
Payer: COMMERCIAL

## 2024-01-22 ENCOUNTER — HOSPITAL ENCOUNTER (OUTPATIENT)
Facility: HOSPITAL | Age: 64
Setting detail: RECURRING SERIES
Discharge: HOME | End: 2024-01-25
Payer: COMMERCIAL

## 2024-01-22 PROCEDURE — 97110 THERAPEUTIC EXERCISES: CPT

## 2024-01-22 PROCEDURE — 97112 NEUROMUSCULAR REEDUCATION: CPT

## 2024-01-22 PROCEDURE — 97140 MANUAL THERAPY 1/> REGIONS: CPT

## 2024-01-22 NOTE — PROGRESS NOTES
note/certification: Established and reviewed with pt  Current: Met - pt reports daily compliance with HEP (1/22/24)    2- Goal: Pt to improve bilateral dorsiflexion and plantarflexion AROM with knee flexed by > 10 degrees to reduce gait deviations when negotiating stairs.   Status at last note/certification:                                                         AROM (deg)  Right Left   Dorsiflexion with Knee Flexed 5 3   Plantar Flexion 35 25    Current: progressing: (1/15/87170)    AROM (deg)  Right Left   Dorsiflexion with Knee Flexed 9 10   Plantar Flexion 41 35         Long Term Goals: To be accomplished in 16 treatments:  1- Goal: Pt to improve bilateral SLS to > 45 seconds with minimal deviations to increase single limb stability and reduce gait deviations when walking  Status at last note/certification: right = 8.5 seconds, left = 30 seconds   Current: progressing: right= 11 seconds,left= 45 seconds. (1/15/2024)    2- Goal: Pt to improve bilateral ankle strength to > 4+/5 globally to increase ability to participate in recreational activities.  Status at last note/certification:    Strength: Right (/5) Left (/5)   Ankle   Dorsiflexion 4+ 4+               PF 11 (reps) 1/2 range 7 (reps)  1/3 range               Inversion 4 4               Eversion 4 4      3- Goal: Pt to improve bilateral great toe extension to > 50 degrees to improve tolerance for prolonged walking activities at home and when traveling.  Status at last note/certification:                  AROM (deg) Right Left   Great Toe Extension 23 25      4- Goal: Pt to report < 5/10 pain at worst to increase ease with ADLs.  Status at last note/certification: 10/10 pain at worst  Current: Progressing - 7/10 worst reported pain in the last week, starts worst pain is reducing in frequency/duration (1/22/24)    5- Goal: Pt to report FOTO score of 58 to show improved function and quality of life.  Status at last note/certification: FOTO 46 pts    Next PN/

## 2024-01-24 ENCOUNTER — HOSPITAL ENCOUNTER (OUTPATIENT)
Facility: HOSPITAL | Age: 64
Setting detail: RECURRING SERIES
Discharge: HOME | End: 2024-01-27
Payer: COMMERCIAL

## 2024-01-24 PROCEDURE — 97140 MANUAL THERAPY 1/> REGIONS: CPT

## 2024-01-24 PROCEDURE — 97110 THERAPEUTIC EXERCISES: CPT

## 2024-01-24 PROCEDURE — 97112 NEUROMUSCULAR REEDUCATION: CPT

## 2024-01-24 NOTE — PROGRESS NOTES
Dorsiflexion 5 5               PF 10 reps (1/2 range) 7 reps (1/2)               Inversion 4+ P! 5               Eversion 4+ P! 4+     3- Goal: Pt to improve bilateral great toe extension to > 50 degrees to improve tolerance for prolonged walking activities at home and when traveling.  Status at last note/certification:                  AROM (deg) Right Left   Great Toe Extension 23 25      4- Goal: Pt to report < 5/10 pain at worst to increase ease with ADLs.  Status at last note/certification: 10/10 pain at worst  Current: Progressing - 7/10 worst reported pain in the last week, starts worst pain is reducing in frequency/duration (1/22/24)     5- Goal: Pt to report FOTO score of 58 to show improved function and quality of life.  Status at last note/certification: FOTO 46 pts    Next PN/ RC due 2/3/2024  Auth due (visit number/ date) IGNACIO    PLAN  - Continue Plan of Care    Kvng Cano PTA    1/24/2024    10:21 AM    Future Appointments   Date Time Provider Department Center   1/29/2024 11:40 AM Kvng Cano PTA [facility]   1/31/2024 10:20 AM ELIZABETH GONZALEZ [facility]   2/5/2024 11:40 AM Zully Huitron PT [facility]

## 2024-01-29 ENCOUNTER — HOSPITAL ENCOUNTER (OUTPATIENT)
Facility: HOSPITAL | Age: 64
Setting detail: RECURRING SERIES
Discharge: HOME | End: 2024-02-01
Payer: COMMERCIAL

## 2024-01-29 PROCEDURE — 97112 NEUROMUSCULAR REEDUCATION: CPT

## 2024-01-29 PROCEDURE — 97110 THERAPEUTIC EXERCISES: CPT

## 2024-01-29 PROCEDURE — 97140 MANUAL THERAPY 1/> REGIONS: CPT

## 2024-01-29 NOTE — PROGRESS NOTES
PHYSICAL / OCCUPATIONAL THERAPY - DAILY TREATMENT NOTE    Patient Name: Jason Liang    Date: 2024    : 1960  Insurance: Payor: [employer] / Plan: [employer] HE[employer] / Product Type: *No Product type* /      Patient  verified Yes     Visit #   Current / Total 7 16   Time   In / Out 11:42 12:32   Pain   In / Out 7 5   Subjective Functional Status/Changes: MY left foot is in a lot more pain today than my left. The weather is not helping.      TREATMENT AREA =  Pain in right ankle [M25.571]  Pain in left ankle [M25.572]  Pain in right foot [M79.671]  Pain in left foot [M79.672]    OBJECTIVE    Modalities Rationale:     decrease pain and increase tissue extensibility to improve patient's ability to progress to PLOF and address remaining functional goals.     min [] Estim Unattended, type/location:                                      []  w/ice    []  w/heat    min [] Estim Attended, type/location:                                     []  w/US     []  w/ice    []  w/heat    []  TENS insruct      min []  Mechanical Traction: type/lbs                   []  pro   []  sup   []  int   []  cont    []  before manual    []  after manual    min []  Ultrasound, settings/location:     10+ 2 minute set up.  min  unbill []  Ice     [x]  Heat    location/position: Bilateral ankles/ lower legs.     min []  Paraffin,  details:     min []  Vasopneumatic Device, press/temp:     min []  Whirlpool / Fluido:    If using vaso (only need to measure limb vaso being performed on)      pre-treatment girth :       post-treatment girth :       measured at (landmark location) :      min []  Other:    Skin assessment post-treatment:   Redness, no adverse reactions      Therapeutic Procedures:    Tx Min Billable or 1:1 Min (if diff from Tx Min) Procedure, Rationale, Specifics   15 15 60520 Manual Therapy (timed):  decrease pain, increase ROM, increase tissue extensibility, and decrease trigger points to improve patient's

## 2024-01-31 ENCOUNTER — HOSPITAL ENCOUNTER (OUTPATIENT)
Facility: HOSPITAL | Age: 64
Setting detail: RECURRING SERIES
Discharge: HOME | End: 2024-02-03
Payer: COMMERCIAL

## 2024-01-31 PROCEDURE — 97530 THERAPEUTIC ACTIVITIES: CPT

## 2024-01-31 PROCEDURE — 97112 NEUROMUSCULAR REEDUCATION: CPT

## 2024-01-31 NOTE — PROGRESS NOTES
PHYSICAL / OCCUPATIONAL THERAPY - DAILY TREATMENT NOTE    Patient Name: Jason Liang    Date: 2024    : 1960  Insurance: Payor: [employer] / Plan: [employer] / Product Type: *No Product type* /      Patient  verified Yes     Visit #   Current / Total 8 16   Time   In / Out 10:22 11:17   Pain   In / Out 6 4   Subjective Functional Status/Changes: The pain doesn't throb as much,mote of a constant pain, dull ache     TREATMENT AREA =  Pain in right ankle [M25.571]  Pain in left ankle [M25.572]  Pain in right foot [M79.671]  Pain in left foot [M79.672]    OBJECTIVE    Modalities Rationale:     decrease pain and increase tissue extensibility to improve patient's ability to progress to PLOF and address remaining functional goals.     min [] Estim Unattended, type/location:                                      []  w/ice    []  w/heat    min [] Estim Attended, type/location:                                     []  w/US     []  w/ice    []  w/heat    []  TENS insruct      min []  Mechanical Traction: type/lbs                   []  pro   []  sup   []  int   []  cont    []  before manual    []  after manual    min []  Ultrasound, settings/location:     10 min  unbill []  Ice     [x]  Heat    location/position: Reclined, bilateral gastroc    min []  Paraffin,  details:     min []  Vasopneumatic Device, press/temp:     min []  Whirlpool / Fluido:    If using vaso (only need to measure limb vaso being performed on)      pre-treatment girth :       post-treatment girth :       measured at (landmark location) :      min []  Other:    Skin assessment post-treatment:   Intact      Therapeutic Procedures:    Tx Min Billable or 1:1 Min (if diff from Tx Min) Procedure, Rationale, Specifics   18  05468 Neuromuscular Re-Education (timed):  improve balance, coordination, kinesthetic sense, posture, core stability and proprioception to improve patient's ability to develop conscious control of individual

## 2024-01-31 NOTE — PROGRESS NOTES
[facility]   [address], [place] [address]  Phone: [phone] Fax: [phone]  PROGRESS NOTE  Patient Name: Jason Liang : 1960   Treatment/Medical Diagnosis: Pain in right ankle [M25.571]  Pain in left ankle [M25.572]  Pain in right foot [M79.671]  Pain in left foot [M79.672]   Referral Source: Chon Shahid MD     Payor: Payor: [employer] / Plan: [employer] / Product Type: *No Product type* /            Date of Initial Visit: 2024 Attended Visits: 8 Missed Visits: 0       CURRENT GOAL STATUS  1- Goal: Pt to be compliant with initial HEP to improve performance of ADLs.  Status at last note/certification: Established and reviewed with pt  Current: Met - pt reports daily compliance with HEP (24)     2- Goal: Pt to improve bilateral dorsiflexion and plantarflexion AROM with knee flexed by > 10 degrees to reduce gait deviations when negotiating stairs.   Status at last note/certification:                                                         AROM (deg)  Right Left   Dorsiflexion with Knee Flexed 5 3   Plantar Flexion 35 25    Current: progressing: (1/15/53812)    AROM (deg)  Right Left   Dorsiflexion with Knee Flexed 9 10   Plantar Flexion 41 35         Long Term Goals: To be accomplished in 16 treatments:  1- Goal: Pt to improve bilateral SLS to > 45 seconds with minimal deviations to increase single limb stability and reduce gait deviations when walking  Status at last note/certification: right = 8.5 seconds, left = 30 seconds   Current: progressing: right= 11 seconds,left= 45 seconds. (1/15/2024)     2- Goal: Pt to improve bilateral ankle strength to > 4+/5 globally to increase ability to participate in recreational activities.  Status at last note/certification:    Strength: Right (/5) Left (/5)   Ankle   Dorsiflexion 4+ 4+               PF 11 (reps) 1/2 range 7 (reps)  1/3 range

## 2024-02-05 ENCOUNTER — HOSPITAL ENCOUNTER (OUTPATIENT)
Facility: HOSPITAL | Age: 64
Setting detail: RECURRING SERIES
Discharge: HOME | End: 2024-02-08
Payer: COMMERCIAL

## 2024-02-05 PROCEDURE — 97110 THERAPEUTIC EXERCISES: CPT

## 2024-02-05 PROCEDURE — 97140 MANUAL THERAPY 1/> REGIONS: CPT

## 2024-02-05 PROCEDURE — 97112 NEUROMUSCULAR REEDUCATION: CPT

## 2024-02-05 NOTE — PROGRESS NOTES
PHYSICAL / OCCUPATIONAL THERAPY - DAILY TREATMENT NOTE    Patient Name: Jason Liang    Date: 2024    : 1960  Insurance: Payor: [employer] / Plan: [employer] / Product Type: *No Product type* /      Patient  verified Yes     Visit #   Current / Total 1 10   Time   In / Out 11:43 12:21   Pain   In / Out 5 4   Subjective Functional Status/Changes: Pt reports having pain in both achilles tendons     TREATMENT AREA =  Pain in right ankle [M25.571]  Pain in left ankle [M25.572]  Pain in right foot [M79.671]  Pain in left foot [M79.672]    OBJECTIVE  Therapeutic Procedures:    Tx Min Billable or 1:1 Min (if diff from Tx Min) Procedure, Rationale, Specifics   12  95970 Neuromuscular Re-Education (timed):  improve balance, coordination, kinesthetic sense, posture, core stability and proprioception to improve patient's ability to develop conscious control of individual muscles and awareness of position of extremities in order to progress to PLOF and address remaining functional goals. (see flow sheet as applicable)     Details if applicable:       15  88976 Manual Therapy (timed):  decrease pain, increase ROM, increase tissue extensibility, and increase postural awareness to improve patient's ability to progress to PLOF and address remaining functional goals.  The manual therapy interventions were performed at a separate and distinct time from the therapeutic activities interventions . (see flow sheet as applicable)     Details if applicable:  in prone: B proximal and distal tibtib grade 2-3 mobs to improve DF/PF, B TC mobs grade 2-3 to improve DF/PF, B DTM/TPR B lateral gastroc/soleus complex.      11  02024 Therapeutic Exercise (timed):  increase ROM, strength, coordination, balance, and proprioception to improve patient's ability to progress to PLOF and address remaining functional goals. (see flow sheet as applicable)     Details if applicable:     38  [facility] BC Totals Reminder: bill using

## 2024-02-07 ENCOUNTER — HOSPITAL ENCOUNTER (OUTPATIENT)
Facility: HOSPITAL | Age: 64
Setting detail: RECURRING SERIES
Discharge: HOME | End: 2024-02-10
Payer: COMMERCIAL

## 2024-02-07 PROCEDURE — 97110 THERAPEUTIC EXERCISES: CPT

## 2024-02-07 PROCEDURE — 97140 MANUAL THERAPY 1/> REGIONS: CPT

## 2024-02-07 NOTE — PROGRESS NOTES
PHYSICAL / OCCUPATIONAL THERAPY - DAILY TREATMENT NOTE    Patient Name: Jason Liang    Date: 2024    : 1960  Insurance: Payor: [employer] / Plan: [employer] HEALTHC[employer] / Product Type: *No Product type* /      Patient  verified Yes     Visit #   Current / Total 2 10   Time   In / Out 12:25 1:07   Pain   In / Out 7 5   Subjective Functional Status/Changes: I've been having a lot of pain in both ankles   today.  I don't know if I pushed it too much with my HEP     TREATMENT AREA =  Pain in right ankle [M25.571]  Pain in left ankle [M25.572]  Pain in right foot [M79.671]  Pain in left foot [M79.672]    OBJECTIVE    Modalities Rationale:     decrease pain and increase tissue extensibility to improve patient's ability to progress to PLOF and address remaining functional goals.     min [] Estim Unattended, type/location:                                      []  w/ice    []  w/heat    min [] Estim Attended, type/location:                                     []  w/US     []  w/ice    []  w/heat    []  TENS insruct      min []  Mechanical Traction: type/lbs                   []  pro   []  sup   []  int   []  cont    []  before manual    []  after manual    min []  Ultrasound, settings/location:     10 min  unbill []  Ice     [x]  Heat    location/position: Reclined, bilateral gastroc    min []  Paraffin,  details:     min []  Vasopneumatic Device, press/temp:     min []  Whirlpool / Fluido:    If using vaso (only need to measure limb vaso being performed on)      pre-treatment girth :       post-treatment girth :       measured at (landmark location) :      min []  Other:    Skin assessment post-treatment:   Intact      Therapeutic Procedures:    Tx Min Billable or 1:1 Min (if diff from Tx Min) Procedure, Rationale, Specifics   23  53662 Manual Therapy (timed):  decrease pain and increase tissue extensibility to improve patient's ability to progress to PLOF and address remaining functional goals.  The

## 2024-02-13 ENCOUNTER — HOSPITAL ENCOUNTER (OUTPATIENT)
Facility: HOSPITAL | Age: 64
Setting detail: RECURRING SERIES
Discharge: HOME | End: 2024-02-16
Payer: MEDICARE

## 2024-02-13 PROCEDURE — 97530 THERAPEUTIC ACTIVITIES: CPT

## 2024-02-13 PROCEDURE — 97112 NEUROMUSCULAR REEDUCATION: CPT

## 2024-02-13 NOTE — PROGRESS NOTES
PHYSICAL / OCCUPATIONAL THERAPY - DAILY TREATMENT NOTE    Patient Name: Jason Liang    Date: 2024    : 1960  Insurance: Payor: [employer] / Plan: [employer] / Product Type: *No Product type* /      Patient  verified Yes     Visit #   Current / Total 3 10   Time   In / Out 11:40 am 12:32 pm   Pain   In / Out 7/10 5/10   Subjective Functional Status/Changes: \"I've had sharp pains in the ankles but its better than it was.  It used to be constant burning, sharp pains but now its every now and then.\"     TREATMENT AREA =  Pain in right ankle [M25.571]  Pain in left ankle [M25.572]  Pain in right foot [M79.671]  Pain in left foot [M79.672]    OBJECTIVE    Modalities Rationale:     decrease pain and increase tissue extensibility to improve patient's ability to progress to PLOF and address remaining functional goals.     min [] Estim Unattended, type/location:                                      []  w/ice    []  w/heat    min [] Estim Attended, type/location:                                     []  w/US     []  w/ice    []  w/heat    []  TENS insruct      min []  Mechanical Traction: type/lbs                   []  pro   []  sup   []  int   []  cont    []  before manual    []  after manual    min []  Ultrasound, settings/location:     10 min  unbill []  Ice     [x]  Heat    location/position: Reclined, with extra towel layer to B achilles area    min []  Paraffin,  details:     min []  Vasopneumatic Device, press/temp:     min []  Whirlpool / Fluido:    If using vaso (only need to measure limb vaso being performed on)      pre-treatment girth :       post-treatment girth :       measured at (landmark location) :      min []  Other:    Skin assessment post-treatment:   Intact      Therapeutic Procedures:    Tx Min Billable or 1:1 Min (if diff from Tx Min) Procedure, Rationale, Specifics   [id number] Therapeutic Activity (timed):  use of dynamic activities replicating functional movements to

## 2024-02-15 ENCOUNTER — HOSPITAL ENCOUNTER (OUTPATIENT)
Facility: HOSPITAL | Age: 64
Setting detail: RECURRING SERIES
Discharge: HOME | End: 2024-02-18
Payer: MEDICARE

## 2024-02-15 PROCEDURE — 97110 THERAPEUTIC EXERCISES: CPT

## 2024-02-15 PROCEDURE — 97112 NEUROMUSCULAR REEDUCATION: CPT

## 2024-02-15 NOTE — PROGRESS NOTES
PHYSICAL / OCCUPATIONAL THERAPY - DAILY TREATMENT NOTE    Patient Name: Jason Liang    Date: 2/15/2024    : 1960  Insurance: Payor: [employer] / Plan: [employer] / Product Type: *No Product type* /      Patient  verified Yes     Visit #   Current / Total 4 10   Time   In / Out 12;20 1:05   Pain   In / Out 2 1   Subjective Functional Status/Changes: I was having sharp pains this morning when walking and doing laundry     TREATMENT AREA =    Pain in right ankle [M25.571]  Pain in left ankle [M25.572]  Pain in right foot [M79.671]  Pain in left foot [M79.672]    OBJECTIVE    Modalities Rationale:     decrease pain and increase tissue extensibility to improve patient's ability to progress to PLOF and address remaining functional goals.                  min [] Estim Unattended, type/location:                                                 []  w/ice    []  w/heat     min [] Estim Attended, type/location:                                                []  w/US     []  w/ice    []  w/heat    []  TENS insruct       min []  Mechanical Traction: type/lbs                    []  pro   []  sup   []  int   []  cont    []  before manual    []  after manual     min []  Ultrasound, settings/location:      10 min  unbill []  Ice     [x]  Heat    location/position: Reclined, with extra towel layer to B achilles area     min []  Paraffin,  details:       min []  Vasopneumatic Device, press/temp:       min []  Whirlpool / Fluido:     If using vaso (only need to measure limb vaso being performed on)      pre-treatment girth :       post-treatment girth :       measured at (landmark location) :       min []  Other:     Skin assessment post-treatment:   Intact      Therapeutic Procedures:    Tx Min Billable or 1:1 Min (if diff from Tx Min) Procedure, Rationale, Specifics   10  [id number] Therapeutic Exercise (timed):  increase ROM, strength, coordination, balance, and proprioception to improve patient's ability to

## 2024-02-19 ENCOUNTER — HOSPITAL ENCOUNTER (OUTPATIENT)
Facility: HOSPITAL | Age: 64
Setting detail: RECURRING SERIES
End: 2024-02-19
Payer: MEDICARE

## 2024-02-20 ENCOUNTER — OFFICE VISIT (OUTPATIENT)
Age: 64
End: 2024-02-20
Payer: MEDICARE

## 2024-02-20 VITALS — HEIGHT: 65 IN | BODY MASS INDEX: 37.44 KG/M2

## 2024-02-20 DIAGNOSIS — M72.2 PLANTAR FASCIAL FIBROMATOSIS: Primary | ICD-10-CM

## 2024-02-20 DIAGNOSIS — M76.61 ACHILLES TENDINITIS, RIGHT LEG: ICD-10-CM

## 2024-02-20 PROCEDURE — G8417 CALC BMI ABV UP PARAM F/U: HCPCS | Performed by: ORTHOPAEDIC SURGERY

## 2024-02-20 PROCEDURE — 1036F TOBACCO NON-USER: CPT | Performed by: ORTHOPAEDIC SURGERY

## 2024-02-20 PROCEDURE — G8427 DOCREV CUR MEDS BY ELIG CLIN: HCPCS | Performed by: ORTHOPAEDIC SURGERY

## 2024-02-20 PROCEDURE — 99213 OFFICE O/P EST LOW 20 MIN: CPT | Performed by: ORTHOPAEDIC SURGERY

## 2024-02-20 PROCEDURE — G8484 FLU IMMUNIZE NO ADMIN: HCPCS | Performed by: ORTHOPAEDIC SURGERY

## 2024-02-20 PROCEDURE — 3017F COLORECTAL CA SCREEN DOC REV: CPT | Performed by: ORTHOPAEDIC SURGERY

## 2024-02-20 NOTE — PROGRESS NOTES
LESION RIGHT Right 2020    US BREAST NEEDLE BIOPSY RIGHT 2020 [facility] RAD US     ALLERGIES:   Allergies   Allergen Reactions    Aspirin      Other reaction(s): Not Reported This Time    Iodine      Other reaction(s): Not Reported This Time  \"x ray dye\"    Phenytoin Sodium Extended      Other reaction(s): Not Reported This Time      FAMILY HISTORY:   Family History   Problem Relation Age of Onset    Breast Cancer Mother      SOCIAL HISTORY:   Social History     Socioeconomic History    Marital status:      Spouse name: None    Number of children: None    Years of education: None    Highest education level: None   Tobacco Use    Smoking status: Former     Current packs/day: 0.00     Types: Cigarettes     Quit date: 1993     Years since quittin.7    Smokeless tobacco: Never       CURRENT MEDICATIONS:   Current Outpatient Medications   Medication Sig    DICLOFENAC POTASSIUM PO Take 75 mg by mouth    FOLIC ACID PO Take 1 mg by mouth    amLODIPine (NORVASC) 5 MG tablet Take 1 tablet by mouth daily    gabapentin (NEURONTIN) 300 MG capsule Take 1 capsule by mouth 3 times daily.    hydroCHLOROthiazide (HYDRODIURIL) 25 MG tablet Take 1 tablet by mouth daily    hydroxychloroquine (PLAQUENIL) 200 MG tablet Take 1 tablet by mouth daily    levothyroxine (SYNTHROID) 50 MCG tablet Take by mouth every morning (before breakfast)    omeprazole (PRILOSEC) 20 MG delayed release capsule Take 1 capsule by mouth daily    psyllium 0.52 g capsule Take 1 capsule by mouth daily    METRONIDAZOLE PO Take by mouth    DULoxetine (CYMBALTA) 30 MG extended release capsule Take 30 mg by mouth daily    raNITIdine (ZANTAC) 150 MG capsule Take 150 mg by mouth 2 times daily    traMADol (ULTRAM) 50 MG tablet Take 50 mg by mouth every 6 hours as needed.    zolpidem (AMBIEN) 10 MG tablet Take by mouth.     No current facility-administered medications for this visit.        DIAGNOSTIC LAB DATA      XR ANKLE LEFT (MIN 3 VIEWS)

## 2024-02-21 ENCOUNTER — HOSPITAL ENCOUNTER (OUTPATIENT)
Facility: HOSPITAL | Age: 64
Setting detail: RECURRING SERIES
Discharge: HOME | End: 2024-02-24
Payer: MEDICARE

## 2024-02-21 PROCEDURE — 97112 NEUROMUSCULAR REEDUCATION: CPT

## 2024-02-21 PROCEDURE — 97140 MANUAL THERAPY 1/> REGIONS: CPT

## 2024-02-21 NOTE — PROGRESS NOTES
PHYSICAL / OCCUPATIONAL THERAPY - DAILY TREATMENT NOTE    Patient Name: Jason Liang    Date: 2024    : 1960  Insurance: Payor: [facility] MEDICARE / Plan: [employer] DUAL COMPLETE / Product Type: *No Product type* /      Patient  verified Yes     Visit #   Current / Total 5 10   Time   In / Out 11:44 12:23   Pain   In / Out 4 3   Subjective Functional Status/Changes: Pt states her pain is not too bad today.      TREATMENT AREA =    Pain in right ankle [M25.571]  Pain in left ankle [M25.572]  Pain in right foot [M79.671]  Pain in left foot [M79.672]    OBJECTIVE  Therapeutic Procedures:    Tx Min Billable or 1:1 Min (if diff from Tx Min) Procedure, Rationale, Specifics   15  27868 Manual Therapy (timed):  decrease pain, increase ROM, increase tissue extensibility, and decrease trigger points to improve patient's ability to progress to PLOF and address remaining functional goals.  The manual therapy interventions were performed at a separate and distinct time from the therapeutic activities interventions . 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    24  34439 Neuromuscular Re-Education (timed):  improve balance, coordination, kinesthetic sense, posture, core stability and proprioception to improve patient's ability to develop conscious control of individual muscles and awareness of position of extremities in order to progress to PLOF and address remaining functional goals. (see flow sheet as applicable)     Details if applicable:     39  [facility] Totals Reminder: bill using total billable min of TIMED therapeutic procedures (example: do not include dry needle or estim unattended, both untimed codes, in totals to left)  8-22 min = 1 unit; 23-37 min = 2 units; 38-52 min = 3 units; 53-67 min = 4 units; 68-82 min = 5 units   Total Total     [x]  Patient Education billed concurrently with other procedures   [x] Review

## 2024-02-26 ENCOUNTER — HOSPITAL ENCOUNTER (OUTPATIENT)
Facility: HOSPITAL | Age: 64
Setting detail: RECURRING SERIES
Discharge: HOME | End: 2024-02-29
Payer: MEDICARE

## 2024-02-26 PROCEDURE — 97530 THERAPEUTIC ACTIVITIES: CPT

## 2024-02-26 PROCEDURE — 97110 THERAPEUTIC EXERCISES: CPT

## 2024-02-26 PROCEDURE — 97112 NEUROMUSCULAR REEDUCATION: CPT

## 2024-02-26 NOTE — PROGRESS NOTES
PHYSICAL / OCCUPATIONAL THERAPY - DAILY TREATMENT NOTE    Patient Name: Jason Liang    Date: 2024    : 1960  Insurance: Payor: [facility] MEDICARE / Plan: [employer] DUAL COMPLETE / Product Type: *No Product type* /      Patient  verified Yes     Visit #   Current / Total 6 10   Time   In / Out 11:45 12:23   Pain   In / Out 4/10 3/10   Subjective Functional Status/Changes: Today is a good day. My pain continues to fluctuate though.       TREATMENT AREA =  Pain in right ankle [M25.571]  Pain in left ankle [M25.572]  Pain in right foot [M79.671]  Pain in left foot [M79.672]    OBJECTIVE         Therapeutic Procedures:    Tx Min Billable or 1:1 Min (if diff from Tx Min) Procedure, Rationale, Specifics   8 8 45434 Therapeutic Activity (timed):  use of dynamic activities replicating functional movements to increase ROM, strength, coordination, balance, and proprioception in order to improve patient's ability to progress to PLOF and address remaining functional goals.  (see flow sheet as applicable)     Details if applicable:       18 18 63483 Neuromuscular Re-Education (timed):  improve balance, coordination, kinesthetic sense, posture, core stability and proprioception to improve patient's ability to develop conscious control of individual muscles and awareness of position of extremities in order to progress to PLOF and address remaining functional goals. (see flow sheet as applicable)     Details if applicable:     12  80955 Therapeutic Exercise (timed):  increase ROM, strength, coordination, balance, and proprioception to improve patient's ability to progress to PLOF and address remaining functional goals. (see flow sheet as applicable)     Details if applicable:               38 38    Total Total     [x]  Patient Education billed concurrently with other procedures   [x] Review HEP    [] Progressed/Changed HEP, detail:    [] Other detail:       Objective Information/Functional

## 2024-02-28 ENCOUNTER — HOSPITAL ENCOUNTER (OUTPATIENT)
Facility: HOSPITAL | Age: 64
Setting detail: RECURRING SERIES
Discharge: HOME | End: 2024-03-02
Payer: MEDICARE

## 2024-02-28 PROCEDURE — 97530 THERAPEUTIC ACTIVITIES: CPT

## 2024-02-28 PROCEDURE — 97110 THERAPEUTIC EXERCISES: CPT

## 2024-02-28 PROCEDURE — 97112 NEUROMUSCULAR REEDUCATION: CPT

## 2024-02-28 NOTE — PROGRESS NOTES
PHYSICAL / OCCUPATIONAL THERAPY - DAILY TREATMENT NOTE    Patient Name: Jason Liang    Date: 2024    : 1960  Insurance: Payor: [facility] MEDICARE / Plan: [employer] DUAL COMPLETE / Product Type: *No Product type* /      Patient  verified Yes     Visit #   Current / Total 7 10   Time   In / Out 11:50 12:25   Pain   In / Out 4 3   Subjective Functional Status/Changes: I am better  but I would still like to be able to walk longer durations     TREATMENT AREA =  Pain in right ankle [M25.571]  Pain in left ankle [M25.572]  Pain in right foot [M79.671]  Pain in left foot [M79.672]    OBJECTIVE      Therapeutic Procedures:    Tx Min Billable or 1:1 Min (if diff from Tx Min) Procedure, Rationale, Specifics   10  01064 Therapeutic Activity (timed):  use of dynamic activities replicating functional movements to increase ROM, strength, coordination, balance, and proprioception in order to improve patient's ability to progress to PLOF and address remaining functional goals.  (see flow sheet as applicable)     Details if applicable:       12  23400 Neuromuscular Re-Education (timed):  improve balance, coordination, kinesthetic sense, posture, core stability and proprioception to improve patient's ability to develop conscious control of individual muscles and awareness of position of extremities in order to progress to PLOF and address remaining functional goals. (see flow sheet as applicable)     Details if applicable:     13  86202 Therapeutic Exercise (timed):  increase ROM, strength, coordination, balance, and proprioception to improve patient's ability to progress to PLOF and address remaining functional goals. (see flow sheet as applicable)     Details if applicable:            Details if applicable:            Details if applicable:     35  [facility] Totals Reminder: bill using total billable min of TIMED therapeutic procedures (example: do not include dry needle or estim unattended, both untimed codes,

## 2024-02-28 NOTE — PROGRESS NOTES
[facility] (formerly [facility]3]   [address], [place] [address]  Phone: [phone] Fax: [phone]  PROGRESS NOTE  Patient Name: Jason Liang : 1960   Treatment/Medical Diagnosis: Pain in right ankle [M25.571]  Pain in left ankle [M25.572]  Pain in right foot [M79.671]  Pain in left foot [M79.672]   Referral Source: Chon Shahid MD     Payor: Payor: [facility] MEDICARE / Plan: [employer] DUAL COMPLETE / Product Type: *No Product type* /            Date of Initial Visit: 2024 Attended Visits: 15 Missed Visits: 0       CURRENT GOAL STATUS  1- Goal: Pt to improve bilateral dorsiflexion and plantarflexion AROM with knee flexed by > 10 degrees to reduce gait deviations when negotiating stairs.   Status at last note/certification: progressing: (1/15/86887)    AROM (deg)  Right Left   Dorsiflexion with Knee Flexed 9 10   Plantar Flexion 41 35    Current: progressing: (2024)       AROM (deg)  Right Left   Dorsiflexion with Knee Flexed 14 17   Plantar Flexion 45 43      2- Goal: Pt to improve bilateral SLS to > 45 seconds with minimal deviations to increase single limb stability and reduce gait deviations when walking  Status at last note/certification: progressing: right= 11 seconds,left= 45 seconds. (1/15/2024)  Current: progressing: right=26 seconds,left= 24 seconds. (2024)     3- Goal: Pt to improve bilateral ankle strength to > 4+/5 globally to increase ability to participate in recreational activities.  Status at last note/certification: progressing: (2024)   Strength: Right (/5) Left (/5)   Ankle   Dorsiflexion 5 5               PF 10 reps (1/2 range) 7 reps (1/2)               Inversion 4+ P! 5               Eversion 4+ P! 4+    Current:progressing: (2024)      Strength: Right (/5) Left (/5)   Ankle   Dorsiflexion 5 5               PF 10 reps (1/2 range) 9 reps (1/2)               Inversion 5 P! 5

## 2024-09-17 ENCOUNTER — TRANSCRIBE ORDERS (OUTPATIENT)
Facility: HOSPITAL | Age: 64
End: 2024-09-17

## 2024-09-17 DIAGNOSIS — Z12.31 SCREENING MAMMOGRAM FOR HIGH-RISK PATIENT: Primary | ICD-10-CM

## 2024-11-04 ENCOUNTER — HOSPITAL ENCOUNTER (OUTPATIENT)
Facility: HOSPITAL | Age: 64
Discharge: HOME | End: 2024-11-07
Payer: MEDICARE

## 2024-11-04 VITALS — WEIGHT: 238 LBS | HEIGHT: 65 IN | BODY MASS INDEX: 39.65 KG/M2

## 2024-11-04 DIAGNOSIS — Z12.31 ENCOUNTER FOR SCREENING MAMMOGRAM FOR HIGH-RISK PATIENT: ICD-10-CM

## 2024-11-04 PROCEDURE — 77063 BREAST TOMOSYNTHESIS BI: CPT

## 2025-05-05 ENCOUNTER — HOSPITAL ENCOUNTER (OUTPATIENT)
Facility: HOSPITAL | Age: 65
Setting detail: SPECIMEN
Discharge: HOME | End: 2025-05-08

## 2025-05-05 LAB — LABCORP SPECIMEN COLLECTION: NORMAL

## 2025-05-05 PROCEDURE — 99001 SPECIMEN HANDLING PT-LAB: CPT
